# Patient Record
Sex: MALE
[De-identification: names, ages, dates, MRNs, and addresses within clinical notes are randomized per-mention and may not be internally consistent; named-entity substitution may affect disease eponyms.]

---

## 2018-11-27 ENCOUNTER — HOSPITAL ENCOUNTER (INPATIENT)
Dept: HOSPITAL 88 - ER | Age: 68
LOS: 5 days | DRG: 853 | End: 2018-12-02
Attending: INTERNAL MEDICINE | Admitting: INTERNAL MEDICINE
Payer: MEDICARE

## 2018-11-27 VITALS — HEIGHT: 72 IN | WEIGHT: 210.19 LBS | BODY MASS INDEX: 28.47 KG/M2

## 2018-11-27 VITALS — SYSTOLIC BLOOD PRESSURE: 139 MMHG | DIASTOLIC BLOOD PRESSURE: 63 MMHG

## 2018-11-27 DIAGNOSIS — E87.70: ICD-10-CM

## 2018-11-27 DIAGNOSIS — E11.628: ICD-10-CM

## 2018-11-27 DIAGNOSIS — R65.21: ICD-10-CM

## 2018-11-27 DIAGNOSIS — L03.115: ICD-10-CM

## 2018-11-27 DIAGNOSIS — J96.01: ICD-10-CM

## 2018-11-27 DIAGNOSIS — R53.81: ICD-10-CM

## 2018-11-27 DIAGNOSIS — E11.42: ICD-10-CM

## 2018-11-27 DIAGNOSIS — E87.6: ICD-10-CM

## 2018-11-27 DIAGNOSIS — I12.9: ICD-10-CM

## 2018-11-27 DIAGNOSIS — L97.529: ICD-10-CM

## 2018-11-27 DIAGNOSIS — I69.354: ICD-10-CM

## 2018-11-27 DIAGNOSIS — E11.610: ICD-10-CM

## 2018-11-27 DIAGNOSIS — B96.89: ICD-10-CM

## 2018-11-27 DIAGNOSIS — N17.0: ICD-10-CM

## 2018-11-27 DIAGNOSIS — N18.3: ICD-10-CM

## 2018-11-27 DIAGNOSIS — E78.5: ICD-10-CM

## 2018-11-27 DIAGNOSIS — Z79.01: ICD-10-CM

## 2018-11-27 DIAGNOSIS — E11.65: ICD-10-CM

## 2018-11-27 DIAGNOSIS — R13.10: ICD-10-CM

## 2018-11-27 DIAGNOSIS — Z66: ICD-10-CM

## 2018-11-27 DIAGNOSIS — I25.10: ICD-10-CM

## 2018-11-27 DIAGNOSIS — Z95.5: ICD-10-CM

## 2018-11-27 DIAGNOSIS — Z79.4: ICD-10-CM

## 2018-11-27 DIAGNOSIS — E87.2: ICD-10-CM

## 2018-11-27 DIAGNOSIS — N17.9: ICD-10-CM

## 2018-11-27 DIAGNOSIS — J18.9: ICD-10-CM

## 2018-11-27 DIAGNOSIS — B95.2: ICD-10-CM

## 2018-11-27 DIAGNOSIS — I89.0: ICD-10-CM

## 2018-11-27 DIAGNOSIS — A41.9: Primary | ICD-10-CM

## 2018-11-27 DIAGNOSIS — F03.90: ICD-10-CM

## 2018-11-27 DIAGNOSIS — D64.9: ICD-10-CM

## 2018-11-27 DIAGNOSIS — I48.91: ICD-10-CM

## 2018-11-27 DIAGNOSIS — R32: ICD-10-CM

## 2018-11-27 DIAGNOSIS — L97.419: ICD-10-CM

## 2018-11-27 DIAGNOSIS — E11.22: ICD-10-CM

## 2018-11-27 DIAGNOSIS — E11.69: ICD-10-CM

## 2018-11-27 DIAGNOSIS — N35.819: ICD-10-CM

## 2018-11-27 DIAGNOSIS — N13.30: ICD-10-CM

## 2018-11-27 DIAGNOSIS — E87.5: ICD-10-CM

## 2018-11-27 DIAGNOSIS — M86.8X7: ICD-10-CM

## 2018-11-27 DIAGNOSIS — N47.1: ICD-10-CM

## 2018-11-27 DIAGNOSIS — R33.9: ICD-10-CM

## 2018-11-27 DIAGNOSIS — E11.621: ICD-10-CM

## 2018-11-27 LAB
ALBUMIN SERPL-MCNC: 2.7 G/DL (ref 3.5–5)
ALBUMIN/GLOB SERPL: 0.5 {RATIO} (ref 0.8–2)
ALP SERPL-CCNC: 89 IU/L (ref 40–150)
ALT SERPL-CCNC: 16 IU/L (ref 0–55)
ANION GAP SERPL CALC-SCNC: 14.2 MMOL/L (ref 8–16)
BACTERIA URNS QL MICRO: (no result) /HPF
BASOPHILS # BLD AUTO: 0 10*3/UL (ref 0–0.1)
BASOPHILS NFR BLD AUTO: 0.1 % (ref 0–1)
BILIRUB UR QL: NEGATIVE
BNP BLD-MCNC: 485.6 PG/ML (ref 0–100)
BUN SERPL-MCNC: 35 MG/DL (ref 7–26)
BUN/CREAT SERPL: 17 (ref 6–25)
CALCIUM SERPL-MCNC: 9.5 MG/DL (ref 8.4–10.2)
CHLORIDE SERPL-SCNC: 102 MMOL/L (ref 98–107)
CLARITY UR: (no result)
CO2 SERPL-SCNC: 25 MMOL/L (ref 22–29)
COLOR UR: YELLOW
DEPRECATED APTT PLAS QN: 35.6 SECONDS (ref 23.8–35.5)
DEPRECATED INR PLAS: 1.05
DEPRECATED NEUTROPHILS # BLD AUTO: 12.5 10*3/UL (ref 2.1–6.9)
DEPRECATED RBC URNS MANUAL-ACNC: (no result) /HPF (ref 0–5)
EGFRCR SERPLBLD CKD-EPI 2021: 32 ML/MIN (ref 60–?)
EOSINOPHIL # BLD AUTO: 0.1 10*3/UL (ref 0–0.4)
EOSINOPHIL NFR BLD AUTO: 0.5 % (ref 0–6)
EPI CELLS URNS QL MICRO: (no result) /LPF
ERYTHROCYTE [DISTWIDTH] IN CORD BLOOD: 13.4 % (ref 11.7–14.4)
GLOBULIN PLAS-MCNC: 5.8 G/DL (ref 2.3–3.5)
GLUCOSE SERPLBLD-MCNC: 167 MG/DL (ref 74–118)
HCT VFR BLD AUTO: 27.6 % (ref 38.2–49.6)
HGB BLD-MCNC: 9.4 G/DL (ref 14–18)
KETONES UR QL STRIP.AUTO: NEGATIVE
LEUKOCYTE ESTERASE UR QL STRIP.AUTO: NEGATIVE
LYMPHOCYTES # BLD: 0.9 10*3/UL (ref 1–3.2)
LYMPHOCYTES NFR BLD AUTO: 6.2 % (ref 18–39.1)
MAGNESIUM SERPL-MCNC: 1.7 MG/DL (ref 1.3–2.1)
MCH RBC QN AUTO: 30.9 PG (ref 28–32)
MCHC RBC AUTO-ENTMCNC: 34.1 G/DL (ref 31–35)
MCV RBC AUTO: 90.8 FL (ref 81–99)
MONOCYTES # BLD AUTO: 1.1 10*3/UL (ref 0.2–0.8)
MONOCYTES NFR BLD AUTO: 7.4 % (ref 4.4–11.3)
NEUTS SEG NFR BLD AUTO: 85.4 % (ref 38.7–80)
NITRITE UR QL STRIP.AUTO: NEGATIVE
PLATELET # BLD AUTO: 364 X10E3/UL (ref 140–360)
POTASSIUM SERPL-SCNC: 3.2 MMOL/L (ref 3.5–5.1)
PROT UR QL STRIP.AUTO: (no result)
PROTHROMBIN TIME: 14.6 SECONDS (ref 11.9–14.5)
RBC # BLD AUTO: 3.04 X10E6/UL (ref 4.3–5.7)
SODIUM SERPL-SCNC: 138 MMOL/L (ref 136–145)
SP GR UR STRIP: 1.02 (ref 1.01–1.02)
UROBILINOGEN UR STRIP-MCNC: 0.2 MG/DL (ref 0.2–1)
WBC #/AREA URNS HPF: (no result) /HPF (ref 0–5)

## 2018-11-27 PROCEDURE — 85730 THROMBOPLASTIN TIME PARTIAL: CPT

## 2018-11-27 PROCEDURE — 83540 ASSAY OF IRON: CPT

## 2018-11-27 PROCEDURE — 94003 VENT MGMT INPAT SUBQ DAY: CPT

## 2018-11-27 PROCEDURE — 80202 ASSAY OF VANCOMYCIN: CPT

## 2018-11-27 PROCEDURE — 83880 ASSAY OF NATRIURETIC PEPTIDE: CPT

## 2018-11-27 PROCEDURE — 74470 X-RAY EXAM OF KIDNEY LESION: CPT

## 2018-11-27 PROCEDURE — 71045 X-RAY EXAM CHEST 1 VIEW: CPT

## 2018-11-27 PROCEDURE — 85007 BL SMEAR W/DIFF WBC COUNT: CPT

## 2018-11-27 PROCEDURE — 85027 COMPLETE CBC AUTOMATED: CPT

## 2018-11-27 PROCEDURE — 93925 LOWER EXTREMITY STUDY: CPT

## 2018-11-27 PROCEDURE — 84466 ASSAY OF TRANSFERRIN: CPT

## 2018-11-27 PROCEDURE — 99285 EMERGENCY DEPT VISIT HI MDM: CPT

## 2018-11-27 PROCEDURE — 87205 SMEAR GRAM STAIN: CPT

## 2018-11-27 PROCEDURE — 86140 C-REACTIVE PROTEIN: CPT

## 2018-11-27 PROCEDURE — 36600 WITHDRAWAL OF ARTERIAL BLOOD: CPT

## 2018-11-27 PROCEDURE — 85651 RBC SED RATE NONAUTOMATED: CPT

## 2018-11-27 PROCEDURE — 80053 COMPREHEN METABOLIC PANEL: CPT

## 2018-11-27 PROCEDURE — 81001 URINALYSIS AUTO W/SCOPE: CPT

## 2018-11-27 PROCEDURE — 94002 VENT MGMT INPAT INIT DAY: CPT

## 2018-11-27 PROCEDURE — 80048 BASIC METABOLIC PNL TOTAL CA: CPT

## 2018-11-27 PROCEDURE — 85025 COMPLETE CBC W/AUTO DIFF WBC: CPT

## 2018-11-27 PROCEDURE — 86850 RBC ANTIBODY SCREEN: CPT

## 2018-11-27 PROCEDURE — 82805 BLOOD GASES W/O2 SATURATION: CPT

## 2018-11-27 PROCEDURE — 83605 ASSAY OF LACTIC ACID: CPT

## 2018-11-27 PROCEDURE — 82575 CREATININE CLEARANCE TEST: CPT

## 2018-11-27 PROCEDURE — 86900 BLOOD TYPING SEROLOGIC ABO: CPT

## 2018-11-27 PROCEDURE — 96360 HYDRATION IV INFUSION INIT: CPT

## 2018-11-27 PROCEDURE — 82948 REAGENT STRIP/BLOOD GLUCOSE: CPT

## 2018-11-27 PROCEDURE — 36415 COLL VENOUS BLD VENIPUNCTURE: CPT

## 2018-11-27 PROCEDURE — 84484 ASSAY OF TROPONIN QUANT: CPT

## 2018-11-27 PROCEDURE — 87071 CULTURE AEROBIC QUANT OTHER: CPT

## 2018-11-27 PROCEDURE — 76937 US GUIDE VASCULAR ACCESS: CPT

## 2018-11-27 PROCEDURE — 76770 US EXAM ABDO BACK WALL COMP: CPT

## 2018-11-27 PROCEDURE — 82728 ASSAY OF FERRITIN: CPT

## 2018-11-27 PROCEDURE — 84166 PROTEIN E-PHORESIS/URINE/CSF: CPT

## 2018-11-27 PROCEDURE — 93005 ELECTROCARDIOGRAM TRACING: CPT

## 2018-11-27 PROCEDURE — 74176 CT ABD & PELVIS W/O CONTRAST: CPT

## 2018-11-27 PROCEDURE — 87186 SC STD MICRODIL/AGAR DIL: CPT

## 2018-11-27 PROCEDURE — 87070 CULTURE OTHR SPECIMN AEROBIC: CPT

## 2018-11-27 PROCEDURE — 94640 AIRWAY INHALATION TREATMENT: CPT

## 2018-11-27 PROCEDURE — 94660 CPAP INITIATION&MGMT: CPT

## 2018-11-27 PROCEDURE — 0JBQ0ZZ EXCISION OF RIGHT FOOT SUBCUTANEOUS TISSUE AND FASCIA, OPEN APPROACH: ICD-10-PCS | Performed by: PODIATRIST

## 2018-11-27 PROCEDURE — 83735 ASSAY OF MAGNESIUM: CPT

## 2018-11-27 PROCEDURE — 36556 INSERT NON-TUNNEL CV CATH: CPT

## 2018-11-27 PROCEDURE — 87086 URINE CULTURE/COLONY COUNT: CPT

## 2018-11-27 PROCEDURE — 85610 PROTHROMBIN TIME: CPT

## 2018-11-27 RX ADMIN — HYDRALAZINE HYDROCHLORIDE SCH MG: 25 TABLET ORAL at 23:00

## 2018-11-27 RX ADMIN — Medication SCH MG: at 23:00

## 2018-11-27 RX ADMIN — TAZOBACTAM SODIUM AND PIPERACILLIN SODIUM SCH MLS/HR: 375; 3 INJECTION, SOLUTION INTRAVENOUS at 23:19

## 2018-11-27 RX ADMIN — ACETAMINOPHEN PRN MG: 325 SOLUTION ORAL at 21:00

## 2018-11-27 NOTE — XMS REPORT
Clinical Summary

                             Created on: 2018



Fabián Mosqueda

External Reference #: [id number]

: 1950

Sex: Male



Demographics







                          Address                   [address] [place]  [address]

 

                          Home Phone                [phone]

 

                          Preferred Language        Unknown

 

                          Marital Status            

 

                          [employer]ybeterian Affiliation     [id number]

 

                          Race                      White

 

                          Ethnic Group              /Latin





Author







                          Author                    [facility]

 

                          Organization              [facility]

 

                          Address                   Unknown

 

                          Phone                     Unavailable







Support







                Name            Relationship    Address         Phone

 

                Fabián Mosqueda    [employer]            Unknown         [phone]







Care Team Providers







                    Care Team Member Name    Role                Phone

 

                    Sebastien Allison MD    PCP                 [phone]







Allergies

No Known Allergies



Medications







                          End Date                  Status



              Medication     Sig          Dispensed     Refills      Start  



                                         Date  

 

                                                    Active



                     pantoprazole (PROTONIX)     Take 40 mg by       0   



                           40 MG EC tablet           mouth daily     



                                         as needed.     

 

                                                    Active



                     pravastatin (PRAVACHOL)     Take 20 mg by       0   



                           20 MG tablet              mouth     



                                         nightly.     

 

                          2018                Discontinued



                     insulin NPH and regular     Inject 50           0   



                           human (HumuLIN 70/30) 100     Units under     



                           unit/mL (70-30) injection     the skin     



                                         every     



                                         morning.     

 

                          2018                Discontinued



                     insulin NPH and regular     Inject 20           0   



                           human (HumuLIN 70/30) 100     Units under     



                           unit/mL (70-30) injection     the skin     



                                         every     



                                         evening.     

 

                          2018                Discontinued



                     metoprolol succinate XL     Take 25 mg by       0   



                           (TOPROL-XL) 25 mg 24 hr     mouth daily.     



                                         tablet      

 

                          2018                Discontinued



                     acetaminophen (TYLENOL)     Take 500 mg         0   



                           500 MG tablet             by mouth once     



                                         as needed for     



                                         mild pain.     

 

                          2018                Discontinued



                     diphenhydrAMINE     Take 25 mg by       0   



                           (BENADRYL) 25 mg tablet     mouth every 8     



                                         (eight)     



                                         hours.     

 

                          2018                Discontinued



                 XARELTO 15 mg tablet     Take 1 tablet      0                 



                           by mouth                  7  



                                         daily.     

 

                          2018                



              insulin 70/30 NPH and     Inject 20     10 mL        12             



                     regular human (HumuLIN     Units under         8  



                           70/30) 100 unit/mL        the skin     



                           (70-30) injection         every morning     



                                         for 30 days.     

 

                          2018                



              insulin 70/30 NPH and     Inject 20     10 mL        12             



                     regular human (HumuLIN     Units under         8  



                           70/30) 100 unit/mL        the skin     



                           (70-30) injection         every evening     



                                         for 30 days.     

 

                          2018                



              rivaroxaban (XARELTO) 20     Take 1 tablet     30 tablet     0              



                     mg tablet           (20 mg total)       8  



                                         by mouth     



                                         daily for 30     



                                         days.     

 

                          2018                



                 cefepime (MAXIPIME) IVPB     Infuse 2 g      0                 



                     2 gram ADD-[place] in 50     into a venous       8  



                           mL                        catheter     



                                         every 12     



                                         (twelve)     



                                         hours for 33     



                                         days.     

 

                          2018                



                 metroNIDAZOLE (FLAGYL)     Take 1 tablet      0                 



                     500 MG tablet       (500 mg             8  



                                         total) by     



                                         mouth 3     



                                         (three) times     



                                         a day for 34     



                                         days.     

 

                          2018                



                 povidone-iodine     Apply           0                 



                     (BETADINE) 10 % external     topically           8  



                           solution                  daily for 30     



                                         days.     

 

                          2018                



              vancomycin 1,000 mg in     Infuse 1,000     1 each       0              



                     sodium chloride 0.9 % 250     mg into a           8  



                           mL IVPB                   venous     



                                         catheter     



                                         every 12     



                                         (twelve)     



                                         hours for 34     



                                         days.     

 

                          10/13/2018                



              hydrALAZINE (APRESOLINE)     Take 1 tablet     60 tablet     0              



                     50 MG tablet        (50 mg total)       8  



                                         by mouth     



                                         every 12     



                                         (twelve)     



                                         hours for 30     



                                         days.     

 

                          10/14/2018                



              metoprolol succinate XL     Take 1 tablet     30 tablet     0              



                     (TOPROL-XL) 100 mg 24 hr     (100 mg             8  



                           tablet                    total) by     



                                         mouth daily     



                                         for 30 days.     

 

                          10/13/2018                



              NIFEdipine XL (PROCARDIA     Take 1 tablet     60 tablet     0              



                     XL) 60 MG 24 hr tablet     (60 mg total)       8  



                                         by mouth 2     



                                         (two) times a     



                                         day for 30     



                                         days.     

 

                          10/13/2018                



              rivaroxaban (XARELTO) 15     Take 1 tablet     30 tablet     0              



                     mg tablet           (15 mg total)       8  



                                         by mouth     



                                         daily for 30     



                                         days.     

 

                          10/13/2018                



              ramelteon (ROZEREM) 8 mg     Take 1 tablet     30 tablet     0              



                     tablet              (8 mg total)        8  



                                         by mouth     



                                         nightly for     



                                         30 days.     

 

                          10/13/2018                



              insulin GLARGINE (LANTUS)     Inject 25     7.5 mL       0              



                     100 unit/mL injection     Units under         8  



                           (vial)                    the skin     



                                         nightly for     



                                         30 days.     

 

                          10/13/2018                



              insulin lispro (HumaLOG)     Inject 10     10 mL        12             



                     100 unit/mL injection     Units under         8  



                                         the skin 3     



                                         (three) times     



                                         a day with     



                                         meals for 30     



                                         days.     







Active Problems







 



                           Problem                   Noted Date

 

 



                           Uncontrolled type 2 diabetes mellitus with nephropathy     2018

 

 



                           Mixed hyperlipidemia      2018

 

 



                           Syncope and collapse      2018

 

 



                           Hypokalemia               2018

 

 



                           Cellulitis of left lower extremity     2018

 

 



                           Urinary tract infection in elderly patient     10/31/2017

 

 



                           Complicated UTI (urinary tract infection)     10/31/2017

 

 



                           Cerebrovascular accident (CVA)     2017







Encounters







                          Care Team                 Description



                     Date                Type                Specialty  

 

                                        



Emma Alba MD Patel, Amitkumar Natvarlal, MD          Cerebrovascular accident (CVA) due to embolism of

 middle cerebral artery, unspecified blood vessel laterality (Primary Dx)



                     2018          [facility]  



                           -                         Encounter   



                                         2018    

 

                                                    N/A



                     2018          Intake              Access  

 

                                        



Denis Molian MD Tang, Daming, MD Joglekar, Swati, MD                     Other acute osteomyelitis of left ankle (Primary Dx); 

Cellulitis of left lower extremity; 

Dehydration; 

Hypokalemia; 

Cellulitis of left leg; 

Chronic cerebrovascular accident (CVA); 

Controlled type 2 diabetes mellitus with other skin complication, without long-
term current use of insulin; 

Essential hypertension; 

Bradycardia; 

Abscess of left leg



                     2018          [facility]  



                           -                         Encounter   



                                         2018    

 

                                        



[facility]                       



                     2018          Orders Only         Procedural Cardiology  



after 2017



Immunizations







  



                     Name                Dates Previously Given     Next Due

 

  



                           FLUCELVAX QUAD PF (0.5mL     2018 



                                         syringe)  







Social History







                                        Date



                 Tobacco Use     Types           Packs/Day       Years Used 

 

                                         



                                         Never Smoker    

 

    



                                         Smokeless Tobacco: Never   



                                         Used   









   



                 Alcohol Use     Drinks/Week     oz/Week         Comments

 

   



                                         No   









 



                           Sex Assigned at Birth     Date Recorded

 

 



                                         Not on file 









                                        Industry



                           Job Start Date            Occupation 

 

                                        Not on file



                           Not on file               Not on file 









                                        Travel End



                           Travel History            Travel Start 

 





                                         No recent travel history available.







Last Filed Vital Signs







                                        Time Taken



                           Vital Sign                Reading 

 

                                        2018  8:22 AM CDT



                           Blood Pressure            140/69 

 

                                        2018  8:22 AM CDT



                           Pulse                     61 

 

                                        2018  8:22 AM CDT



                           Temperature               36.3 C (97.4 F) 

 

                                        2018  8:22 AM CDT



                           Respiratory Rate          19 

 

                                        2018  8:22 AM CDT



                           Oxygen Saturation         97% 

 

                                        -



                           Inhaled Oxygen            - 



                                         Concentration  

 

                                        2018  5:00 PM CDT



                           Weight                    87.1 kg (192 lb) 

 

                                        2018  5:00 PM CDT



                           Height                    182.9 cm (6') 

 

                                        2018  5:00 PM CDT



                           Body Mass Index           26.04 







Plan of Treatment







   



                 Health Maintenance     Due Date        Last Done       Comments

 

   



                           DIABETIC RETINAL EYE EXAM     1950  

 

   



                           URINE MICROALBUMIN        1958  

 

   



                           COLON CANCER SCREENING     1998  

 

   



                           SHINGRIX VACCINE (1 of 2)     1998  

 

   



                           ZOSTER VACCINE            2008  

 

   



                           PNEUMOCOCCAL              2013  



                                         POLYSACCHARIDE VACCINE   



                                         AGE 65 AND OVER   

 

   



                           PNEUMOCOCCAL-13           2013  

 

   



                     DIABETIC FOOT EXAM     2019, 2018 

 

   



                     INFLUENZA VACCINE     Completed           2018 







Procedures







                                        Comments



                 Procedure Name     Priority        Date/Time       Associated Diagnosis 

 

                                        



Results for this procedure are in the results section.



                     POC GLUCOSE         Routine             2018  



                                         12:52 PM CDT  

 

                                        



Results for this procedure are in the results section.



                     POC GLUCOSE         Routine             2018  



                                         9:01 AM CDT  

 

                                        



Results for this procedure are in the results section.



                     POC GLUCOSE         Routine             2018  



                                         9:07 PM CDT  

 

                                        



Results for this procedure are in the results section.



                     POC GLUCOSE         Routine             2018  



                                         5:02 PM CDT  

 

                                        



Results for this procedure are in the results section.



                     POC GLUCOSE         Routine             2018  



                                         12:07 PM CDT  

 

                                        



Results for this procedure are in the results section.



                     POC GLUCOSE         Routine             2018  



                                         7:48 AM CDT  

 

                                        



Results for this procedure are in the results section.



                     POC GLUCOSE         Routine             2018  



                                         9:33 PM CDT  

 

                                        



Results for this procedure are in the results section.



                     POC GLUCOSE         Routine             2018  



                                         5:19 PM CDT  

 

                                        



Results for this procedure are in the results section.



                     FL MODIFIED BARIUM     Routine             2018  



                           SWALLOW                   2:32 PM CDT  

 

                                        



Results for this procedure are in the results section.



                     POC GLUCOSE         Routine             2018  



                                         11:32 AM CDT  

 

                                        



Results for this procedure are in the results section.



                     POC GLUCOSE         Routine             2018  



                                         8:51 AM CDT  

 

                                        



Results for this procedure are in the results section.



                     POC GLUCOSE         Routine             2018  



                                         5:03 AM CDT  

 

                                        



Results for this procedure are in the results section.



                     ZZESTIMATED GFR     Routine             2018  



                                         4:45 AM CDT  

 

                                        



Results for this procedure are in the results section.



                     PARTIAL THROMBOPLASTIN     Routine             2018  



                           TIME (PTT)                4:45 AM CDT  

 

                                        



Results for this procedure are in the results section.



                     MAGNESIUM LEVEL     Routine             2018  



                                         4:45 AM CDT  

 

                                        



Results for this procedure are in the results section.



                     HC COMPLETE BLD COUNT     Routine             2018  



                           W/AUTO DIFF               4:45 AM CDT  

 

                                        



Results for this procedure are in the results section.



                     BASIC METABOLIC PANEL     Routine             2018  



                                         4:45 AM CDT  

 

                                        



Results for this procedure are in the results section.



                     POC GLUCOSE         Routine             09/10/2018  



                                         11:14 PM CDT  

 

                                        



Results for this procedure are in the results section.



                     POC GLUCOSE         Routine             09/10/2018  



                                         7:45 PM CDT  

 

                                        



Results for this procedure are in the results section.



                     PARTIAL THROMBOPLASTIN     Routine             09/10/2018  



                           TIME (PTT)                3:00 PM CDT  

 

                                        



Results for this procedure are in the results section.



                     POC GLUCOSE         Routine             09/10/2018  



                                         12:41 PM CDT  

 

                                        



Results for this procedure are in the results section.



                     EEG AWAKE/ASLEEP LESS     Routine             09/10/2018  



                           THAN 41 MIN               10:35 AM CDT  

 

                                        



Results for this procedure are in the results section.



                     PARTIAL THROMBOPLASTIN     Routine             09/10/2018  



                           TIME (PTT)                9:00 AM CDT  

 

                                        



Results for this procedure are in the results section.



                     POC GLUCOSE         Routine             09/10/2018  



                                         8:30 AM CDT  

 

                                        



Results for this procedure are in the results section.



                     MRA NECK WO CONTRAST     Routine             09/10/2018  



                                         7:38 AM CDT  

 

                                        



Results for this procedure are in the results section.



                     MRA HEAD WO CONTRAST     Routine             09/10/2018  



                                         7:22 AM CDT  

 

                                        



Results for this procedure are in the results section.



                     MRI BRAIN WO CONTRAST     Routine             09/10/2018  



                                         7:10 AM CDT  

 

                                        



Results for this procedure are in the results section.



                     ZZESTIMATED GFR     Routine             09/10/2018  



                                         1:24 AM CDT  

 

                                        



Results for this procedure are in the results section.



                     MAGNESIUM LEVEL     Routine             09/10/2018  



                                         1:24 AM CDT  

 

                                        



Results for this procedure are in the results section.



                     BASIC METABOLIC PANEL     Routine             09/10/2018  



                                         1:24 AM CDT  

 

                                        



Results for this procedure are in the results section.



                     PARTIAL THROMBOPLASTIN     Timed               09/10/2018  



                           TIME (PTT)                12:40 AM CDT  

 

                                        



Results for this procedure are in the results section.



                     HC COMPLETE BLD COUNT     Routine             09/10/2018  



                           W/AUTO DIFF               12:40 AM CDT  

 

                                        



Results for this procedure are in the results section.



                     POC GLUCOSE         Routine             2018  



                                         11:42 PM CDT  

 

                                        



Results for this procedure are in the results section.



                     POC GLUCOSE         Routine             2018  



                                         5:47 PM CDT  

 

                                        



Results for this procedure are in the results section.



                     PARTIAL THROMBOPLASTIN     Timed               2018  



                           TIME (PTT)                5:15 PM CDT  

 

                                        



Results for this procedure are in the results section.



                     XR BONE SURVEY SKELETAL     Routine             2018  



                                         4:23 PM CDT  

 

                                        



Results for this procedure are in the results section.



                     US DUPLEX VENOUS UPPER     Routine             2018  



                           EXTREMITY LEFT            2:33 PM CDT  

 

                                        



Results for this procedure are in the results section.



                     US DUPLEX VENOUS LOWER     Routine             2018  



                           EXTREMITY BILATERAL       2:33 PM CDT  

 

                                        



Results for this procedure are in the results section.



                     POC GLUCOSE         Routine             2018  



                                         1:13 PM CDT  

 

                                        



Results for this procedure are in the results section.



                     POC GLUCOSE         Routine             2018  



                                         12:17 PM CDT  

 

                                        



Results for this procedure are in the results section.



                     CT HEAD WO CONTRAST     STAT                2018  



                                         10:27 AM CDT  

 

                                        



Results for this procedure are in the results section.



                     PARTIAL THROMBOPLASTIN     Routine             2018  



                           TIME (PTT)                9:21 AM CDT  

 

                                        



Results for this procedure are in the results section.



                     VITAMIN D 25 HYDROXY     Routine             2018  



                           LEVEL                     8:59 AM CDT  

 

                                        



Results for this procedure are in the results section.



                     POC GLUCOSE         Routine             2018  



                                         8:21 AM CDT  

 

                                        



Results for this procedure are in the results section.



                     ZZESTIMATED GFR     Routine             2018  



                                         1:43 AM CDT  

 

                                        



Results for this procedure are in the results section.



                     VITAMIN B12 LEVEL     Routine             2018  



                                         1:43 AM CDT  

 

                                        



Results for this procedure are in the results section.



                     THYROID STIMULATING     Routine             2018  



                           HORMONE                   1:43 AM CDT  

 

                                        



Results for this procedure are in the results section.



                     T4, FREE            Routine             2018  



                                         1:43 AM CDT  

 

                                        



Results for this procedure are in the results section.



                     COMPREHENSIVE METABOLIC     Routine             2018  



                           PANEL                     1:43 AM CDT  

 

                                        



Results for this procedure are in the results section.



                     RHEUMATOID FACTOR     Routine             2018  



                                         1:43 AM CDT  

 

                                        



Results for this procedure are in the results section.



                     LIPID PANEL         Routine             2018  



                                         1:43 AM CDT  

 

                                        



Results for this procedure are in the results section.



                     HOMOCYSTINE, PLASMA     Routine             2018  



                                         1:43 AM CDT  

 

                                        



Results for this procedure are in the results section.



                     C-REACTIVE PROTEIN     Routine             2018  



                                         1:43 AM CDT  

 

                                        



Results for this procedure are in the results section.



                     MAGNESIUM LEVEL     Routine             2018  



                                         1:43 AM CDT  

 

                                        



Results for this procedure are in the results section.



                     SEDIMENTATION RATE     Routine             2018  



                                         1:20 AM CDT  

 

                                        



Results for this procedure are in the results section.



                     HEMOGLOBIN A1C      Routine             2018  



                                         1:20 AM CDT  

 

                                        



Results for this procedure are in the results section.



                     FOLATE RBC (GROUP TEST)     Routine             2018  



                                         1:20 AM CDT  

 

                                        



Results for this procedure are in the results section.



                     CBC WITH PLATELET AND     Routine             2018  



                           DIFFERENTIAL              1:20 AM CDT  

 

                                        



Results for this procedure are in the results section.



                     PARTIAL THROMBOPLASTIN     Timed               2018  



                           TIME (PTT)                1:00 AM CDT  

 

                                        



Results for this procedure are in the results section.



                     PROTHROMBIN TIME WITH INR     Routine             2018  



                                         6:40 PM CDT  

 

                                        



Results for this procedure are in the results section.



                     PARTIAL THROMBOPLASTIN     Routine             2018  



                           TIME (PTT)                6:40 PM CDT  

 

                                        



Results for this procedure are in the results section.



                     BLOOD CULTURE, AEROBIC &     Routine             2018  



                           ANAEROBIC                 6:35 PM CDT  

 

                                        



Results for this procedure are in the results section.



                     POC GLUCOSE         Routine             2018  



                                         6:06 PM CDT  

 

                                        



Results for this procedure are in the results section.



                     CT HEAD WO CONTRAST     Routine             2018  



                                         3:24 PM CDT  

 

                                        



Results for this procedure are in the results section.



                     ECG 12-LEAD         Routine             2018  



                                         2:07 PM CDT  

 

                                        



Results for this procedure are in the results section.



                     POC GLUCOSE         Routine             2018  



                                         1:52 PM CDT  

 

                                        



Results for this procedure are in the results section.



                     ECHOCARDIOGRAM 2D     Routine             2018  



                           COMPLETE W MMODE SPECTRAL      12:20 PM CDT  



                                         COLOR DOPPLER (99694)    

 

                                        



Results for this procedure are in the results section.



                     POC GLUCOSE         Routine             2018  



                                         10:10 AM CDT  

 

                                        



Results for this procedure are in the results section.



                     XR CHEST 2 VW       Routine             2018  



                                         8:17 AM CDT  

 

                                        



Results for this procedure are in the results section.



                     URINALYSIS SCREEN AND     Routine             2018  



                           MICROSCOPY, WITH REFLEX      1:18 AM CDT  



                                         TO CULTURE    

 

                                        



Results for this procedure are in the results section.



                     URINE CULTURE       Routine             2018  



                                         1:18 AM CDT  

 

                                        



Results for this procedure are in the results section.



                     HC COMPLETE BLD COUNT     Routine             2018  



                           W/AUTO DIFF               12:15 AM CDT  

 

                                        



Results for this procedure are in the results section.



                     ZZESTIMATED GFR     Routine             2018  



                                         11:30 PM CDT  

 

                                        



Results for this procedure are in the results section.



                     COMPREHENSIVE METABOLIC     Routine             2018  



                           PANEL                     11:30 PM CDT  

 

                                        



Results for this procedure are in the results section.



                     POC GLUCOSE         Routine             2018  



                                         5:50 PM CST  

 

                                        



Results for this procedure are in the results section.



                     POC GLUCOSE         Routine             2018  



                                         12:17 PM CST  

 

                                        



Results for this procedure are in the results section.



                     POC GLUCOSE         Routine             2018  



                                         7:53 AM CST  

 

                                        



Results for this procedure are in the results section.



                     MANUAL DIFFERENTIAL     Routine             2018  



                                         5:30 AM CST  

 

                                        



Results for this procedure are in the results section.



                     ZZESTIMATED GFR     Routine             2018  



                                         5:30 AM CST  

 

                                        



Results for this procedure are in the results section.



                     BASIC METABOLIC PANEL     Routine             2018  



                                         5:30 AM CST  

 

                                        



Results for this procedure are in the results section.



                     CBC WITH PLATELET AND     Routine             2018  



                           DIFFERENTIAL              5:30 AM CST  

 

                                        



Results for this procedure are in the results section.



                     POC GLUCOSE         Routine             2018  



                                         10:14 PM CST  

 

                                        



Results for this procedure are in the results section.



                     POC GLUCOSE         Routine             2018  



                                         6:45 PM CST  

 

                                        



Results for this procedure are in the results section.



                     POC GLUCOSE         Routine             2018  



                                         6:16 AM CST  

 

                                        



Results for this procedure are in the results section.



                     POC GLUCOSE         Routine             2018  



                                         5:56 AM CST  

 

                                        



Results for this procedure are in the results section.



                     POC GLUCOSE         Routine             2018  



                                         5:20 AM CST  

 

                                        



Results for this procedure are in the results section.



                     ZZESTIMATED GFR     Routine             2018  



                                         4:00 AM CST  

 

                                        



Results for this procedure are in the results section.



                     C-REACTIVE PROTEIN     Routine             2018  



                                         4:00 AM CST  

 

                                        



Results for this procedure are in the results section.



                     COMPREHENSIVE METABOLIC     Routine             2018  



                           PANEL                     4:00 AM CST  

 

                                        



Results for this procedure are in the results section.



                     MANUAL DIFFERENTIAL     Routine             2018  



                                         3:30 AM CST  

 

                                        



Results for this procedure are in the results section.



                     SEDIMENTATION RATE     Routine             2018  



                                         3:30 AM CST  

 

                                        



Results for this procedure are in the results section.



                     CBC WITH PLATELET AND     Routine             2018  



                           DIFFERENTIAL              3:30 AM CST  

 

                                        



Results for this procedure are in the results section.



                     POC GLUCOSE         Routine             2018  



                                         9:17 PM CST  

 

                                        



Results for this procedure are in the results section.



                     POC GLUCOSE         Routine             2018  



                                         4:51 PM CST  

 

                                        



Results for this procedure are in the results section.



                 XR PICC CHEST PORTABLE     Routine         2018      Cellulitis of left leg 



                                         4:13 PM CST  

 

                                        



Results for this procedure are in the results section.



                     HC CATH DUAL LUMEN PICC     Routine             2018  



                                         3:49 PM CST  

 

                                        



Results for this procedure are in the results section.



                     HC US GUIDED VASCULAR     Routine             2018  



                           ACCESS                    3:49 PM CST  

 

                                        



Results for this procedure are in the results section.



                     HC CVL PICC INSERT 5 YRS     Routine             2018  



                           OR >                      3:49 PM CST  

 

                                        



Results for this procedure are in the results section.



                     POC GLUCOSE         Routine             2018  



                                         11:28 AM CST  

 

                                        



Results for this procedure are in the results section.



                     POC GLUCOSE         Routine             2018  



                                         7:17 AM CST  

 

                                        



Results for this procedure are in the results section.



                     POC GLUCOSE         Routine             2018  



                                         9:30 PM CST  

 

                                        



Results for this procedure are in the results section.



                     POC GLUCOSE         Routine             2018  



                                         4:58 PM CST  

 

                                        



Results for this procedure are in the results section.



                     POC GLUCOSE         Routine             2018  



                                         1:06 PM CST  

 

                                        



Results for this procedure are in the results section.



                     VANCOMYCIN LEVEL, TROUGH     Timed               2018  



                                         9:38 AM CST  

 

                                        



Results for this procedure are in the results section.



                     POC GLUCOSE         Routine             2018  



                                         7:47 AM CST  

 

                                        



Results for this procedure are in the results section.



                     ZZESTIMATED GFR     Routine             2018  



                                         7:00 AM CST  

 

                                        



Results for this procedure are in the results section.



                     BASIC METABOLIC PANEL     Routine             2018  



                                         7:00 AM CST  

 

                                        



Results for this procedure are in the results section.



                     CBC HEMOGRAM        Routine             2018  



                                         7:00 AM CST  

 

                                        



Results for this procedure are in the results section.



                     POC GLUCOSE         Routine             01/15/2018  



                                         8:37 PM CST  

 

                                        



Results for this procedure are in the results section.



                     XR FOOT 3+ VW LEFT     Routine             01/15/2018  



                                         7:52 PM CST  

 

                                        



Results for this procedure are in the results section.



                     POC GLUCOSE         Routine             01/15/2018  



                                         6:40 PM CST  

 

                                        



Results for this procedure are in the results section.



                     GRAM STAIN          Routine             01/15/2018  



                                         6:37 PM CST  

 

                                        



Results for this procedure are in the results section.



                     AFB STAIN           Routine             01/15/2018  



                                         6:37 PM CST  

 

                                        



Results for this procedure are in the results section.



                     FUNGUS SMEAR        Routine             01/15/2018  



                                         6:37 PM CST  

 

                                        



Results for this procedure are in the results section.



                     AEROBIC CULTURE     Routine             01/15/2018  



                                         6:37 PM CST  

 

                                        



Results for this procedure are in the results section.



                     AFB CULTURE         Routine             01/15/2018  



                                         6:37 PM CST  

 

                                        



Results for this procedure are in the results section.



                     ANAEROBIC CULTURE     Routine             01/15/2018  



                                         6:37 PM CST  

 

                                        



Results for this procedure are in the results section.



                     FUNGUS CULTURE      Routine             01/15/2018  



                                         6:37 PM CST  

 

                                        



Results for this procedure are in the results section.



                     POC GLUCOSE         Routine             01/15/2018  



                                         12:21 PM CST  

 

                                        



Results for this procedure are in the results section.



                     POC GLUCOSE         Routine             01/15/2018  



                                         7:49 AM CST  

 

                                        



Results for this procedure are in the results section.



                     POC GLUCOSE         Routine             2018  



                                         11:00 PM CST  

 

                                        



Results for this procedure are in the results section.



                     POC GLUCOSE         Routine             2018  



                                         5:40 PM CST  

 

                                        



Results for this procedure are in the results section.



                     POC GLUCOSE         Routine             2018  



                                         1:13 PM CST  

 

                                        



Results for this procedure are in the results section.



                     POC GLUCOSE         Routine             2018  



                                         8:19 AM CST  

 

                                        



Results for this procedure are in the results section.



                     ZZESTIMATED GFR     Routine             2018  



                                         4:50 AM CST  

 

                                        



Results for this procedure are in the results section.



                     HC COMPLETE BLD COUNT     Routine             2018  



                           W/AUTO DIFF               4:50 AM CST  

 

                                        



Results for this procedure are in the results section.



                     BASIC METABOLIC PANEL     Routine             2018  



                                         4:50 AM CST  

 

                                        



Results for this procedure are in the results section.



                     POC GLUCOSE         Routine             2018  



                                         10:25 PM CST  

 

                                        



Results for this procedure are in the results section.



                     POC GLUCOSE         Routine             2018  



                                         7:21 PM CST  

 

                                        



Results for this procedure are in the results section.



                     MRI LOWER EXTREMITY W WO     Routine             2018  



                           CONTRAST LEFT             6:35 PM CST  

 

                                        



Results for this procedure are in the results section.



                     GRAM STAIN          Routine             2018  



                                         4:51 PM CST  

 

                                        



Results for this procedure are in the results section.



                     ANAEROBIC CULTURE     Routine             2018  



                                         4:51 PM CST  

 

                                        



Results for this procedure are in the results section.



                     AEROBIC CULTURE     Routine             2018  



                                         4:51 PM CST  

 

                                        



Results for this procedure are in the results section.



                     POC GLUCOSE         Routine             2018  



                                         7:54 AM CST  

 

                                        



Results for this procedure are in the results section.



                     LACTIC ACID LEVEL, SEPSIS     Timed               2018  



                           - NOW AND REPEAT 2X EVERY      5:10 AM CST  



                                         3 HOURS    

 

                                        



Results for this procedure are in the results section.



                     POC GLUCOSE         Routine             2018  



                                         5:08 AM CST  

 

                                        



Results for this procedure are in the results section.



                     LACTIC ACID LEVEL, SEPSIS     Timed               2018  



                           - NOW AND REPEAT 2X EVERY      1:07 AM CST  



                                         3 HOURS    

 

                                        



Results for this procedure are in the results section.



                     US DUPLEX VENOUS LOWER     STAT                2018  



                           EXTREMITY LEFT            11:19 PM CST  

 

                                        



Results for this procedure are in the results section.



                     US DUPLEX ARTERIAL LOWER     STAT                2018  



                           EXTREMITY LEFT            10:45 PM CST  

 

                                        



Results for this procedure are in the results section.



                     ZZESTIMATED GFR     STAT                2018  



                                         5:33 PM CST  

 

                                        



Results for this procedure are in the results section.



                     TROPONIN            STAT                2018  



                                         5:33 PM CST  

 

                                        



Results for this procedure are in the results section.



                     B NATRIURETIC PEPTIDE     STAT                2018  



                                         5:33 PM CST  

 

                                        



Results for this procedure are in the results section.



                     LACTIC ACID LEVEL, SEPSIS     STAT                2018  



                           - NOW AND REPEAT 2X EVERY      5:33 PM CST  



                                         3 HOURS    

 

                                        



Results for this procedure are in the results section.



                     COMPREHENSIVE METABOLIC     STAT                2018  



                           PANEL                     5:33 PM CST  

 

                                        



Results for this procedure are in the results section.



                     PARTIAL THROMBOPLASTIN     STAT                2018  



                           TIME (PTT)                5:33 PM CST  

 

                                        



Results for this procedure are in the results section.



                     PROTHROMBIN TIME WITH INR     STAT                2018  



                                         5:33 PM CST  

 

                                        



Results for this procedure are in the results section.



                     HC COMPLETE BLD COUNT     STAT                2018  



                           W/AUTO DIFF               5:33 PM CST  

 

                                        



Results for this procedure are in the results section.



                     BLOOD CULTURE, AEROBIC &     Routine             2018  



                           ANAEROBIC                 5:33 PM CST  

 

                                        



Results for this procedure are in the results section.



                     BLOOD CULTURE, AEROBIC &     Routine             2018  



                           ANAEROBIC                 5:33 PM CST  



after 2017



Results

* POC glucose (2018 12:52 PM CDT)



Only the most recent of 51 results within the time period is included.





   



                 Component       Value           Ref Range       Performed At

 

   



                 POC glucose     242 (H)         65 - 99 mg/dL     [facility] DEPARTMENT OF



                           Comment:                  PATHOLOGY AND



                           [facility] Notified RN           GENOMIC MEDICINE



                                         Meter ID: [id number]  



                                         : Yonatan Merida  









   



                 Performing Organization     Address         City/[facility]     Phone Number

 

   



                     [facility] DEPARTMENT OF     [address] [place] [address] 



                                         PATHOLOGY AND GENOMIC   



                                         MEDICINE   





* FL Modified Barium Swallow (2018  2:32 PM CDT)





 



                           Narrative                 Performed At

 

 



                           EXAMINATION:FL MODIFIED BARIUM SWALLOW      [facility]



                                         CLINICAL HISTORY:Dysphagia following cerebral infarction 



                                         COMPARISON:None. 



                                         RADIATION EXPOSURE: 8.8 mGy air kerma 



                                         TECHNIQUE: 



                                         The patient swallowed varying consistencies of barium under direct lateral 



                                         fluoroscopic evaluation. The study was performed in conjunction with speech 



                                         pathology. 



                                         IMPRESSION: 



                                         There is flash laryngeal penetration during swallowing of thin and nectar 



                                         liquids. No aspiration was seen. 



                                         Please refer to Speech Pathology report for further details. 



                                         [facility]-[id number] 









                                        Procedure Note

 

                                        



 Interface, Radiology Results Incoming - 2018  2:56 PM CDT



EXAMINATION:  FL MODIFIED BARIUM SWALLOW



CLINICAL HISTORY:  Dysphagia following cerebral infarction



COMPARISON:  None.



RADIATION EXPOSURE: 8.8 mGy air kerma



TECHNIQUE:



The patient swallowed varying consistencies of barium under direct lateral 
fluoroscopic evaluation. The study was performed in conjunction with speech 
pathology.



IMPRESSION:



There is flash laryngeal penetration during swallowing of thin and nectar 
liquids. No aspiration was seen.



Please refer to Speech Pathology report for further details.



[facility]-[id number]











   



                 Performing Organization     Address         City/[facility] Cent[facility]ode     Phone Number

 

   



                      [facility]          [address] [place] [address] 





* Estimated GFR (2018  4:45 AM CDT)



Only the most recent of 9 results within the time period is included.





   



                 Component       Value           Ref Range       Performed At

 

   



                 GFR Non Af Amer     46 (A)          mL/min/1.73 m2     [facility] DEPARTMENT OF



                                         PATHOLOGY AND



                                         GENOMIC MEDICINE

 

   



                 GFR Af Amer     56 (A)          mL/min/1.73 m2     [facility] DEPARTMENT OF



                           Comment:                  PATHOLOGY AND



                           Chronic kidney disease: <60      GENOMIC MEDICINE



                                         mL/min/1.73m2  



                                         Kidney failure: <15  



                                         mL/min/1.73m2  



                                         The estimated GFR is  



                                         calculated from the  



                                         IDMS-traceable Modification of  



                                         Diet  



                                         in Renal Disease Equation. The  



                                         accuracy of the calculation is  



                                         poor when the  



                                         creatinine is normal.  



                                         Calculated values >90  



                                         mL/min/1.73m2 are not  



                                         reported.  



                                         This equation has not been  



                                         validated in children (<18  



                                         years), pregnant  



                                         women, the elderly (>70  



                                         years), or ethnic groups other  



                                         than Caucasians and  



                                          Americans.  













                                         Specimen

 





                                         Plasma specimen









   



                 Performing Organization     Address         City/[facility] Centercode     Phone Number

 

   



                     [place] [address] 



                                         PATHOLOGY AND [facility]   



                                         MEDICINE   





* Partial thromboplastin time, activated (2018  4:45 AM CDT)



Only the most recent of 9 results within the time period is included.





   



                 Component       Value           Ref Range       Performed At

 

   



                 PTT             30.5            23.0 - 36.0 sec     [facility] DEPARTMENT OF



                           Comment:                  PATHOLOGY AND



                           PTT therapeutic range for      [facility]



                                         unfractionated heparin is  



                                         61.0-112.0 seconds which  



                                         corresponds to Anti-Xa  



                                         0.3-0.7 U/ml.  













                                         Specimen

 





                                         Blood









   



                 Performing Organization     Address         City/[facility] Centercode     Phone Number

 

   



                     [place] [address] 



                                         PATHOLOGY AND [employer]   



                                         MEDICINE   





* CBC with platelet and differential (2018  4:45 AM CDT)



Only the most recent of 8 results within the time period is included.





   



                 Component       Value           Ref Range       Performed At

 

   



                 WBC             8.22            4.50 - 11.00 k/uL     [facility] DEPARTMENT OF



                                         PATHOLOGY AND



                                         GENOMIC MEDICINE

 

   



                 RBC             4.54            4.40 - 6.00 m/uL     [facility] DEPARTMENT OF



                                         PATHOLOGY AND



                                         GENOMIC MEDICINE

 

   



                 HGB             13.2 (L)        14.0 - 18.0 g/dL     [facility] DEPARTMENT OF



                                         PATHOLOGY AND



                                         GENOMIC MEDICINE

 

   



                 HCT             39.7 (L)        41.0 - 51.0 %     [facility] DEPARTMENT OF



                                         PATHOLOGY AND



                                         GENOMIC MEDICINE

 

   



                 MCV             87.4            82.0 - 100.0 fL     [facility] DEPARTMENT OF



                                         PATHOLOGY AND



                                         GENOMIC MEDICINE

 

   



                 MCH             29.1            27.0 - 34.0 pg     [facility] DEPARTMENT OF



                                         PATHOLOGY AND



                                         GENOMIC MEDICINE

 

   



                 MCHC            33.2            31.0 - 37.0 g/dL     [facility] DEPARTMENT OF



                                         PATHOLOGY AND



                                         GENOMIC MEDICINE

 

   



                 RDW - SD        44.3            37.0 - 55.0 fL     [facility] DEPARTMENT OF



                                         PATHOLOGY AND



                                         GENOMIC MEDICINE

 

   



                 MPV             11.0            8.8 - 13.2 fL     [facility] DEPARTMENT OF



                                         PATHOLOGY AND



                                         GENOMIC MEDICINE

 

   



                 Platelet count     194             150 - 400 k/uL     [facility] DEPARTMENT OF



                                         PATHOLOGY AND



                                         GENOMIC MEDICINE

 

   



                 Nucleated RBC     0.00            /100 WBC        [facility] DEPARTMENT OF



                                         PATHOLOGY AND



                                         GENOMIC MEDICINE

 

   



                 Neutrophils     72.7 (H)        39.0 - 69.0 %     [facility] DEPARTMENT OF



                                         PATHOLOGY AND



                                         GENOMIC MEDICINE

 

   



                 Lymphocytes     14.0 (L)        25.0 - 45.0 %     [facility] DEPARTMENT OF



                                         PATHOLOGY AND



                                         GENOMIC MEDICINE

 

   



                 Monocytes       11.7 (H)        0.0 - 10.0 %     [facility] DEPARTMENT OF



                                         PATHOLOGY AND



                                         GENOMIC MEDICINE

 

   



                 Eosinophils     1.0             0.0 - 5.0 %     [facility] DEPARTMENT OF



                                         PATHOLOGY AND



                                         GENOMIC MEDICINE

 

   



                 Basophils       0.2             0.0 - 1.0 %     [facility] DEPARTMENT OF



                                         PATHOLOGY AND



                                         GENOMIC MEDICINE

 

   



                 Immature granulocytes     0.4Comment: "Immature     0.0 - 1.0 %     [facility] DEPARTMENT OF





                           granulocytes"  (promyelocytes,      PATHOLOGY AND



                           myelocytes, metamyelocytes)      GENOMIC MEDICINE













                                         Specimen

 





                                         Blood









   



                 Performing Organization     Address         City/[facility]/Zipcode     Phone Number

 

   



                     [place] [address] 



                                         PATHOLOGY AND GENOMIC   



                                         MEDICINE   





* Magnesium level (2018  4:45 AM CDT)



Only the most recent of 3 results within the time period is included.





   



                 Component       Value           Ref Range       Performed At

 

   



                 Magnesium       2.1             1.6 - 2.4 mg/dL     [facility] DEPARTMENT OF



                                         PATHOLOGY AND



                                         GENOMIC MEDICINE













                                         Specimen

 





                                         Plasma specimen









   



                 Performing Organization     Address         City/[facility] Cent[facility]ode     Phone Number

 

   



                     [place] [address] 



                                         PATHOLOGY [facility]   





* Basic metabolic panel (2018  4:45 AM CDT)



Only the most recent of 5 results within the time period is included.





   



                 Component       Value           Ref Range       Performed At

 

   



                 Sodium          138             135 - 148 mEq/L     [facility] DEPARTMENT OF



                                         PATHOLOGY AND



                                         GENOMIC MEDICINE

 

   



                 Potassium       3.5             3.5 - 5.0 mEq/L     [facility] DEPARTMENT OF



                                         PATHOLOGY AND



                                         GENOMIC MEDICINE

 

   



                 Chloride        100             98 - 112 mEq/L     [facility] DEPARTMENT OF



                                         PATHOLOGY AND



                                         GENOMIC MEDICINE

 

   



                 CO2             26              24 - 31 mEq/L     [facility] DEPARTMENT OF



                                         PATHOLOGY AND



                                         GENOMIC MEDICINE

 

   



                 Anion gap       12@ANIO         7 - 15 mEq/L     [facility] DEPARTMENT OF



                                         PATHOLOGY AND



                                         GENOMIC MEDICINE

 

   



                 BUN             27 (H)          8 - 23 mg/dL     [facility] DEPARTMENT OF



                                         PATHOLOGY AND



                                         GENOMIC MEDICINE

 

   



                 Creatinine      1.5 (H)         0.70 - 1.20 mg/dL     [facility] DEPARTMENT OF



                                         PATHOLOGY AND



                                         GENOMIC MEDICINE

 

   



                 Glucose         218 (H)         65 - 99 mg/dL     [facility] DEPARTMENT OF



                                         PATHOLOGY AND



                                         GENOMIC MEDICINE

 

   



                 Calcium         8.1 (L)         8.8 - 10.2 mg/dL     [facility] DEPARTMENT OF



                                         PATHOLOGY AND



                                         GENOMIC MEDICINE













                                         Specimen

 





                                         Plasma specimen









   



                 Performing Organization     Address         City/State/Zipcode     Phone Number

 

   



                     [facility] DEPARTMENT OF     [address] [place] [address] 



                                         PATHOLOGY AND GENOMIC   



                                         MEDICINE   





* EEG (routine) (09/10/2018 10:35 AM CDT)





 



                           Narrative                 Performed At

 

 



                                         This result has an attachment that is not available. 



                                         EEG AWAKE AND DROWSY 



                                         Date of Service: 9/10/18 



                                         Awake Recordings: The occipital dominant rhythm is 6-7 Hz and is poorly 



                                         sustained. Low voltage 4-5 Hz and 18-22 Hz activity was present in all 



                                         regions.1.5-3 Hz activity was recorded in the right frontal central 



                                         temporal region. 



                                         Sleep Recording: No sleep was recorded. 



                                         Hyperventilation: Was not performed. 



                                         Photic Stimulation: Was not performed. 



                                         Impression: The findings are consistent with a diffuse disturbance in 



                                         brain function with a focal lesion in the right frontal central temporal 



                                         region. No seizures occurred. 



                                         ICD-10 Code: R569 





* MRA Neck Wo Contrast (09/10/2018  7:38 AM CDT)





 



                           Narrative                 Performed At

 

 



                           EXAMINATION: MRA NECK WO CONTRAST      [facility]



                                         CLINICAL HISTORY: STROKE. Evaluate for artery stenosis 



                                         COMPARISON:None 



                                         TECHNIQUE: 2-D and 3-D Time-of-flight MRA images of the cervical vessels were 



                                         obtained with multiplanar and 3-D reconstructive algorithms. 



                                         FINDINGS: 



                                         The left internal carotid artery is occluded just distal to its origin with 100%

 



                                         stenosis by NASCET criteria. There is no significant stenosis in the right 



                                         internal carotid artery by NASCET criteria. The left vertebral artery is 



                                         dominant with tiny right 



                                         vertebral artery in the cervical region.There is no significant focal 



                                         stenosis in the vertebral arteries. The common carotid arteries do not show 



                                         significant focal stenosis. 



                                         IMPRESSION: 



                                         The left internal carotid artery is occluded just distal to its origin with 100%

 



                                         stenosis by NASCET criteria. 



                                         [facility]-[id number] 









                                        Procedure Note

 

                                        



[facility], Radiology Results Incoming - 09/10/2018  8:12 AM CDT



EXAMINATION: MRA NECK WO CONTRAST



CLINICAL HISTORY: STROKE. Evaluate for artery stenosis



COMPARISON:  None



TECHNIQUE: 2-D and 3-D Time-of-flight MRA images of the cervical vessels were 
obtained with multiplanar and 3-D reconstructive algorithms.



FINDINGS:



The left internal carotid artery is occluded just distal to its origin with 100%
stenosis by NASCET criteria. There is no significant stenosis in the right 
internal carotid artery by NASCET criteria. The left vertebral artery is 
dominant with tiny right 

vertebral artery in the cervical region.  There is no significant focal stenosis
in the vertebral arteries. The common carotid arteries do not show significant 
focal stenosis.





IMPRESSION:



The left internal carotid artery is occluded just distal to its origin with 100%
stenosis by NASCET criteria. 















[facility]-[id number]











   



                 Performing Organization     Address         City/State/Zipcode     Phone Number

 

   



                     [facility]          [address] [place] [address] 





* MRA Head Wo Contrast (09/10/2018  7:22 AM CDT)





 



                           Narrative                 Performed At

 

 



                           EXAMINATION: MRA HEAD WO CONTRAST     [facility]



                                         CLINICAL HISTORY: STROKE. Evaluate for artery stenosis. 



                                         COMPARISON:None 



                                         TECHNIQUE: Time-of-flight MRA images of the [place] vessels were 



                                         obtained with multiplanar and 3-D reconstructive algorithms. 



                                         FINDINGS: 



                                         There is flow in the anterior and both posterior communicating arteries. The 



                                         distal left internal carotid artery is occluded with a small amount of flow 



                                         beginning just below the posterior communicating artery extending up to the 



                                         bifurcation. The left A1 



                                         segment is smaller than the right. There is no significant focal stenosis in the

 



                                         anterior, middle or posterior cerebral arteries. The study does not include the

 



                                         distal portion of the right vertebral artery which was hypoplastic in the 



                                         cervical region. 



                                         The distal most portion of the left vertebral artery and the basilar artery do 





                                         not show significant focal stenosis. 



                                         IMPRESSION: 



                                         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 





                                         the anterior and posterior circulation. 



                                         [facility]-[id number] 









                                        Procedure Note

 

                                        



[facility], Radiology Results Incoming - 09/10/2018 12:02 PM CDT



EXAMINATION: MRA HEAD WO CONTRAST



CLINICAL HISTORY: STROKE. Evaluate for artery stenosis.



COMPARISON:  None



TECHNIQUE: Time-of-flight MRA images of the [place] vessels were 
obtained with multiplanar and 3-D reconstructive algorithms.



FINDINGS:



There is flow in the anterior and both posterior communicating arteries. The 
distal left internal carotid artery is occluded with a small amount of flow 
beginning just below the posterior communicating artery extending up to the 
bifurcation. The left A1 

segment is smaller than the right. There is no significant focal stenosis in the
anterior, middle or posterior cerebral arteries. The study does not include the 
distal portion of the right vertebral artery which was hypoplastic in the 
cervical region. 

The distal most portion of the left vertebral artery and the basilar artery do 
not show significant focal stenosis.



IMPRESSION:



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 
the anterior and posterior circulation.



[facility]-[id number]











   



                 Performing Organization     Address         City/State/Zipcode     Phone Number

 

   



                     [facility]          [address] [place] [address] 





* MRI Brain Wo Contrast (09/10/2018  7:10 AM CDT)





 



                           Narrative                 Performed At

 

 



                           This result has an attachment that is not available.      [facility]



                                         EXAMINATION: MRI BRAIN WO CONTRAST 



                                         CLINICAL HISTORY: NEURO DEFICITACUTESINGLEPROGRESSING, STROKE 



                                         COMPARISON:CT brain from yesterday. MRI brain from 2018. 



                                         TECHNIQUE: Multiplanar and multisequence MRI imaging of the brain was obtained 





                                         without contrast. 



                                         FINDINGS: 



                                         As seen on the CT exam, there is a recent stroke in the right paracentral 



                                         frontal lobe in the anterior cerebral artery territory. There is localized mass

 



                                         effect. There is no definite evidence of acute hemorrhage in this region. 



                                         There is nonspecific enlargement of the ventricles and extra axial space in the

 



                                         supra and infratentorial region and there are nonspecific white matter changes.

 



                                         There is old infarct with old hemorrhage in the left posterior frontal lobe and

 



                                         left frontal 



                                         operculum. There are some areas of blooming in the right parietal cortex and 



                                         periventricular region consistent with old hemorrhagic products or 



                                         calcification. There is old infarct in the left basal ganglia. There are old 



                                         infarcts or perivascular spaces 



                                         in the thalami. There are old infarcts in the loren and cerebellum. 



                                         There is pseudophakia. 



                                         IMPRESSION: 



                                         Recent infarct in the right anterior cerebral artery territory as seen on the CT

 



                                         exam. 



                                         Old areas of infarction some which have old hemorrhage. 



                                         [facility]-[id number] 









                                        Procedure Note

 

                                        



 Interface, Radiology Results Incoming - 09/10/2018 12:01 PM CDT



EXAMINATION: MRI BRAIN WO CONTRAST



CLINICAL HISTORY: NEURO DEFICIT  ACUTE  SINGLE  PROGRESSING, STROKE



COMPARISON:  CT brain from yesterday. MRI brain from 2018.



TECHNIQUE: Multiplanar and multisequence MRI imaging of the brain was obtained 
without contrast.





FINDINGS:



As seen on the CT exam, there is a recent stroke in the right paracentral 
frontal lobe in the anterior cerebral artery territory. There is localized mass 
effect. There is no definite evidence of acute hemorrhage in this region.



There is nonspecific enlargement of the ventricles and extra axial space in the 
supra and infratentorial region and there are nonspecific white matter changes. 
There is old infarct with old hemorrhage in the left posterior frontal lobe and 
left frontal 

operculum. There are some areas of blooming in the right parietal cortex and 
periventricular region consistent with old hemorrhagic products or 
calcification. There is old infarct in the left basal ganglia. There are old 
infarcts or perivascular spaces 

in the thalami. There are old infarcts in the loren and cerebellum.



There is pseudophakia.



IMPRESSION:



Recent infarct in the right anterior cerebral artery territory as seen on the CT
exam.



Old areas of infarction some which have old hemorrhage.

















[facility]-[id number]  











   



                 Performing Organization     Address         City/State/Zipcode     Phone Number

 

   



                      [facility]          [address] [place] [address] 





* XR Bone Survey Skeletal (2018  4:23 PM CDT)





 



                           Narrative                 Performed At

 

 



                           EXAMINATION:XR BONE SURVEY SKELETAL      [facility]



                                         CLINICAL HISTORY:MRI consent 



                                         COMPARISON:None. 



                                         IMPRESSION: 



                                         1.Skull: Intact. Radiopaque/metallic dentition 



                                         2.Cervical spine: Intact. 



                                         3.Thoracic spine: Intact. 



                                         4.RIBS: Intact. No radiopaque densities in the chest. 



                                         5.Lumbar spine: Intact. 



                                         6.ABDOMEN: Bowel gas pattern is normal. No radiopaque foreign body. 



                                         7.Pelvis: Intact. Atherosclerotic disease involving the abdominal aorta and

 



                                         iliac vessels. 



                                         8.Right humerus: Intact. 



                                         9.Left humerus: Intact. 



                                         10.Bilateral forearm. Intact. No radiopaque metallic foreign body. Small 



                                         vessel atherosclerotic disease. 



                                         11.Right femur: Intact. 



                                         12.Left femur: Distal femoral cortical thickening with a subcortical lucency

 



                                         likely relating to sequela of old trauma and periosteal reaction. In addition, 





                                         there are posttraumatic changes involving the proximal tibia. Calcific density 





                                         along the distal 



                                         tibial and fibular cortex may relate to periostitis or sequela of chronic lower

 



                                         extremity swelling/venous reflux. No radiopaque metallic hardware. 



                                         13.Right femur: Intact. The right tibia and fibula are intact. No radiopaque

 



                                         foreign body. 



                                         14.Overall, there is no radiopaque metallic artifact involving the 



                                         visualized body parts. 



                                         [facility]-[id number] 









                                        Procedure Note

 

                                        



 Interface, Radiology Results Incoming - 2018  5:09 PM CDT



EXAMINATION:  XR BONE SURVEY SKELETAL



CLINICAL HISTORY:  MRI consent



COMPARISON:  None.





IMPRESSION:



                                        1.  Skull: Intact. Radiopaque/metallic dentition



                                        2.  Cervical spine: Intact.



                                        3.  Thoracic spine: Intact.



                                        4.  RIBS: Intact. No radiopaque densities in the chest.



                                        5.  Lumbar spine: Intact.



                                        6.  ABDOMEN: Bowel gas pattern is normal. No radiopaque foreign body.



                                        7.  Pelvis: Intact. Atherosclerotic disease involving the abdominal aorta and iliac

 vessels.



                                        8.  Right humerus: Intact.



                                        9.  Left humerus: Intact.



                                        10.  Bilateral forearm. Intact. No radiopaque metallic foreign body. Small vessel

 atherosclerotic disease.



                                        11.  Right femur: Intact.



                                        12.  Left femur: Distal femoral cortical thickening with a subcortical lucency likely

 relating to sequela of old trauma and periosteal reaction. In addition, there 
are posttraumatic changes involving the proximal tibia. Calcific density along 
the distal 

tibial and fibular cortex may relate to periostitis or sequela of chronic lower 
extremity swelling/venous reflux. No radiopaque metallic hardware.



                                        13.  Right femur: Intact. The right tibia and fibula are intact. No radiopaque foreign

 body.



                                        14.  Overall, there is no radiopaque metallic artifact involving the visualized 

body parts.







[facility]-[id number]











   



                 Performing Organization     Address         City/State/Zipcode     Phone Number

 

   



                      [facility]          [address] [place] [address] 





* Pv duplex venous upper extremity (2018  2:33 PM CDT)





 



                           Narrative                 Performed At

 

 



                                [facility]





                                         Vascular Ultrasound Laboratory 



                                         Upper Extremity Venous Report 



                                          [address].Name:Deedee MOSQUEDA.ID:[id number] 





                                         .Date: 2018Refer.MD:EMMA ALBA MD 



                                         Exam Time: 2:09:00 PMStudy Type:UE 



                                         Venous 



                                         DOBAge:1950,68Y Sex: 



                                         MALE 



                                         Sonogrphr: Man Sow RN, RVTPat. 



                                         Stat.:Inpatient 



                                         [facility] 



                                         GABRIELA, 



                                         CPT - 4: 78736 Echo Event 



                                         ID:[id number] 



                                         Order ID:[id number] 



                                         Reason for Study:Left arm edema. 



                                         Procedures:Colorflow, Grayscale/2D, Pulsed wave Doppler 



                                         Race: 



                                         ------------------------------------ 



                                         SUMMARY: 



                                         ------------------------------------ 



                                         DUPLEX SCAN OBSERVATIONS 



                                          Right Left 



                                         IJ Normal 



                                         SubclavianNormal Normal 



                                         Axillary Normal 



                                         Brachial Normal 



                                         Basilic Normal 



                                         Cephalic Normal 



                                          



                                         LEFT:There is normal compressibility and no evidence of echogenic 



                                         material noted within the lumen of the visualized veins. Colorflow and 



                                         Doppler signals are normal. 



                                         PRELIMINARY FINDINGS 



                                         1.Normal venous duplex scan of the left upper extremity. 



                                         PHYSICIAN INTERPRETATION 



                                         Venous examination of the left upper extremity and neck demonstrated 



                                         no evidence of venous thrombosis. 



                                         Signed 2018 06:39 PM 



                                         Nico Burns MD, RPLEYDA 









                                        Procedure Note

 

                                        



Interface, Radiology Results In - 2018  6:39 PM CDT



                                   

                    Vascular Ultrasound Laboratory

                    Upper Extremity Venous Report

           [address] [place] [address]

                                   

Pat.Name:  FABIÁN MOSQUEDA          Pat.ID:    [id number]               

St.Date:   2018                Refer.MD:  EMMA ALBA MD  

Exam Time: 2:09:00 PM              Study Type:UE Venous               

  Age:  1950,68Y           Sex:       MALE                    

Sonogrphr: Man Sow RN, RVT  Pat. Stat.:Inpatient               

Room:      [address]  Vol: DP,                     

CPT - 4:   03234                   Echo Event ID:[id number]            

Order ID:  [id number]              

Reason for Study:Left arm edema.   

Procedures:Colorflow, Grayscale/2D, Pulsed wave Doppler

Race:                      

                                        ------------------------------------

SUMMARY:

                                        ------------------------------------

DUPLEX SCAN OBSERVATIONS

 

   Right Left

 IJ   Normal

 Subclavian  Normal Normal

 Axillary   Normal

 Brachial   Normal

 Basilic   Normal

 Cephalic   Normal

 

  

 LEFT:  There is normal compressibility and no evidence of echogenic

material noted within the lumen of the visualized veins. Colorflow and

Doppler signals are normal.   

 

 PRELIMINARY FINDINGS

 1.  Normal venous duplex scan of the left upper extremity.

 

 PHYSICIAN INTERPRETATION 

 Venous examination of the left upper extremity and neck demonstrated

no evidence of venous thrombosis.

 

 

 

Signed 2018 06:39 PM

Nico Burns MD, RPVI









   



                 Performing Organization     Address         City/State/Zipcode     Phone Number

 

   



                     [facility]            [address] [place] [address] 





* Pv duplex venous lower extremity (2018  2:33 PM CDT)



Only the most recent of 2 results within the time period is included.





 



                           Narrative                 Performed At

 

 



                                [facility]





                                         Vascular Ultrasound Laboratory 



                                         Lower Extremity Venous Report 



                                          [address] [address], [place], [place] [address].Name:Deedee MOSQUEDA.ID:[id number] 





                                         .Date: 2018Refer.MD:EMMA ALBA MD 



                                         Exam Time: 1:45:00 PMStudy Type:LE 



                                         Venous 



                                         DOBAge:1950,68Y Sex: 



                                         MALE 



                                         Sonogrphr: Man Sow RN, RVTPat. 



                                         Stat.:Inpatient 



                                         [facility] 



                                         GABRIELA, 



                                         CPT - 4: 50555 Echo Event 



                                         ID:[id number] 



                                         Order ID:[id number] 



                                         Reason for Study:Bilateral leg edema.Questionable DVT. 



                                         Procedures:Colorflow, Grayscale/2D, Pulsed wave Doppler 



                                         Race: 



                                         ------------------------------------ 



                                         SUMMARY: 



                                         ------------------------------------ 



                                         DUPLEX SCAN OBSERVATIONS 



                                          Deep VeinsSuperficial Veins 



                                         RightLeft RightLeft 



                                          GSV (prox) NormalNormal 



                                         CFV Normal Normal (above knee) 



                                         Femoral Normal Normal GSV (dist) Normal Not Visualized 



                                         Profunda Normal Normal (below knee) 



                                         Popliteal Normal Not Visualized 



                                         PT (prox) Normal Not visualizedSSV Not Visualized Not Visualized 



                                         PT (dist) Normal Not Visualized 



                                         Peroneal Normal Not Visualized 



                                         RIGHT:There is normal compressibility with no evidence of echogenic 



                                         material noted within the lumen of the visualized veins.Colorflow 



                                         and Doppler signals are normal. 



                                         LEFT:There is normal compressibility with no evidence of echogenic 



                                         material noted within the lumen of the visualized veins.Colorflow 



                                         and Doppler signals are normal. 



                                         PRELIMINARY FINDINGS 



                                         1.No evidence of deep venous thrombosis in the visualized veins. 



                                         PHYSICIAN INTERPRETATION 



                                         Venous examination of the both lower extremities demonstrated no 



                                         evidence of venous thrombosis in the visualized veins.Normal 



                                         compressibility and augmentation of all veins visualized. 



                                         Signed 2018 06:33 PM 



                                         Nico Burns MD, RPVI 









                                        Procedure Note

 

                                        



Interface, Radiology Results In - 2018  6:33 PM CDT



                                   

                    Vascular Ultrasound Laboratory

                    Lower Extremity Venous Report

           [address] [facility] [address] [id number]

                                   

Pat.Name:  FABIÁN MOSQUEDA.ID:    [id number]               

.Date:   2018                Refer.MD:  EMMA ALBA MD  

Exam Time: 1:45:00 PM              Study Type:LE Venous               

  Age:  1950,68Y           Sex:       MALE                    

Sonogrphr: Man Sow RN, RVT  Pat. Stat.:Inpatient               

Room:      [facility]                  Tape  Vol: DP,                     

CPT - 4:   19468                   Echo Event ID:[id number]            

Order ID:  [id number]              

Reason for Study:Bilateral leg edema.  Questionable DVT.

Procedures:Colorflow, Grayscale/2D, Pulsed wave Doppler

Race:                      

                                        ------------------------------------

SUMMARY:

                                        ------------------------------------

 DUPLEX SCAN OBSERVATIONS

 

   Deep Veins  Superficial Veins

  Right  Left Right  Left

     GSV (prox) Normal  Normal

 CFV Normal Normal (above knee)

 Femoral Normal Normal GSV (dist) Normal Not Visualized

 Profunda Normal Normal (below knee)

 Popliteal Normal Not Visualized

 PT (prox) Normal Not visualized  SSV Not Visualized Not Visualized

 PT (dist) Normal Not Visualized 

 Peroneal Normal Not Visualized 

 

 RIGHT:  There is normal compressibility with no evidence of echogenic

material noted within the lumen of the visualized veins.  Colorflow

and Doppler signals are normal.

 

 LEFT:  There is normal compressibility with no evidence of echogenic

material noted within the lumen of the visualized veins.  Colorflow

and Doppler signals are normal.   

 

 PRELIMINARY FINDINGS

 1.  No evidence of deep venous thrombosis in the visualized veins.

 

 PHYSICIAN INTERPRETATION 

 Venous examination of the both lower extremities demonstrated no

evidence of venous thrombosis in the visualized veins.  Normal

compressibility and augmentation of all veins visualized.

 

Signed 2018 06:33 PM

Nico Burns MD, VI









   



                 Performing Organization     Address         City/State/Zipcode     Phone Number

 

   



                      [facility]            [address] [id number] 





* CT Head Wo Contrast (2018 10:27 AM CDT)



Only the most recent of 2 results within the time period is included.





 



                           Narrative                 Performed At

 

 



                           EXAMINATION: CT HEAD WO CONTRAST     [facility]



                                         CLINICAL HISTORY: Neuro deficit(s)subacute, Altered level of consciousness 





                                         (LOC)unexplained 



                                         COMPARISON:CT brain from yesterday. MRI the brain from 2017. 



                                         TECHNIQUE: Noncontrast enhanced images of the brain were obtained from the skull

 



                                         base to the vertex. Both soft tissue and bone reconstruction algorithms were 



                                         performed.CT scans are performed using radiation dose reduction techniques.

 



                                         Technical factors 



                                         are evaluated and adjusted to ensure appropriate moderation of exposure. 



                                         Automated dose management technology is applied to adjust radiation exposure 



                                         while achieving a diagnostic quality image. 



                                         FINDINGS: 



                                         Artifacts obscure details. 



                                         There is a large recent infarct throughout the paracentral right frontal lobe in

 



                                         the anterior cerebral artery distribution with cytotoxic and vasogenic edema and

 



                                         localized mass effect. There is no evidence of acute hemorrhage. 



                                         There are chronic changes in the rest of the brain including old infarcts which

 



                                         are stable. There is calcification in the walls of some of the arteries. There 





                                         is nonspecific increased bone density. The nasal septum is slightly deviated. 



                                         IMPRESSION: 



                                         New subacute density stroke in the right anterior cerebral artery territory. 



                                         Findings discussed with the patient's nurse Gabriela at 1031 hours. She 



                                         verbalized understanding. 



                                         [facility]-[id number] 









                                        Procedure Note

 

                                        



Hm Interface, Radiology Results Incoming - 2018 10:36 AM CDT



EXAMINATION: CT HEAD WO CONTRAST



CLINICAL HISTORY: Neuro deficit(s)  subacute, Altered level of consciousness 
(LOC)  unexplained



COMPARISON:  CT brain from yesterday. MRI the brain from 2017.



TECHNIQUE: Noncontrast enhanced images of the brain were obtained from the skull
base to the vertex. Both soft tissue and bone reconstruction algorithms were 
performed.  CT scans are performed using radiation dose reduction techniques. 
Technical factors 

are evaluated and adjusted to ensure appropriate moderation of exposure. 
Automated dose management technology is applied to adjust radiation exposure 
while achieving a diagnostic quality image.





FINDINGS:



Artifacts obscure details.



There is a large recent infarct throughout the paracentral right frontal lobe in
the anterior cerebral artery distribution with cytotoxic and vasogenic edema and
localized mass effect. There is no evidence of acute hemorrhage. 



There are chronic changes in the rest of the brain including old infarcts which 
are stable. There is calcification in the walls of some of the arteries. There 
is nonspecific increased bone density. The nasal septum is slightly deviated.





IMPRESSION:



New subacute density stroke in the right anterior cerebral artery territory.



Findings discussed with the patient's nurse Gabriela at 1031 hours. She 
verbalized understanding.









[facility]-[id number]











   



                 Performing Organization     Address         City/[facility]     Phone Number

 

   



                     [facility]          [address] [id number] 





* Vitamin D 25 hydroxy level (2018  8:59 AM CDT)





   



                 Component       Value           Ref Range       Performed At

 

   



                 Vitamin D, 25-hydroxy     9.4 (L)         30.0 - 150.0 ng/mL     [facility] DEPARTMENT OF



                           Comment:                  PATHOLOGY AND



                           This assay reports the sum of      [employer] [facility]



                                         25-hydroxy vitamin D3 and  



                                         25-hydroxy vitamin  



                                         D2.  



                                         Reference range:  



                                         0-17 years:  



                                         Deficiency: less than 20ng/mL  



                                         Optimum level: greater than or  



                                         equal to 20 ng/mL.  



                                         18 years and older:  



                                         Deficiency: less than 20ng/mL  



                                         Insufficiency: 20-29 ng/mL  



                                         Optimum Level: 30-80 ng/mL  



                                         The assay reportable range is  



                                         3.4155.9 ng/mL. Levels  



                                         higher than 150  



                                         ng/mL may be associated with  



                                         toxicity.  



                                         If toxicity is clinically  



                                         suspected and the reported  



                                         result is >155.9  



                                         ng/mL,contact lab for  



                                         alternative methods to obtain  



                                         a definitivelevel.  



                                         If separate quantitation of  



                                         25-hydroxy vitamin D3 and  



                                         25-hydroxy vitamin D2  



                                         is needed, please contact lab  



                                         for alternative methods.  













                                         Specimen

 





                                         Blood









   



                 Performing Organization     Address         City/[facility]     Phone Number

 

   



                     [place] [phone] 



                                         PATHOLOGY AND GENOMIC   



                                         MEDICINE   





* Homocystine, plasma (2018  1:43 AM CDT)





   



                 Component       Value           Ref Range       Performed At

 

   



                 Homocysteine     8.7             0.0 - 15.0 umol/L     [facility] DEPARTMENT OF



                           Comment:                  PATHOLOGY AND



                           The risk for coronary vascular      GENOMIC MEDICINE



                                         disease increases  



                                         progressively  



                                         with homocysteine  



                                         concentration.A 3.4 times  



                                         greater risk is  



                                         associated with a homocysteine  



                                         concentration of greater  



                                         than  



                                         15.8 umol/L as compared to a  



                                         concentration below 14.1  



                                         umol/L.  













                                         Specimen

 





                                         Plasma specimen









   



                 Performing Organization     Address         City/[facility]/Zipcode     Phone Number

 

   



                     [address] [address] 



                                         PATHOLOGY AND GENOMIC   



                                         MEDICINE   





* Rheumatoid factor (2018  1:43 AM CDT)





   



                 Component       Value           Ref Range       Performed At

 

   



                 Rheumatoid factor     <10             0 - 13 IU/mL     [facility] DEPARTMENT OF



                                         PATHOLOGY AND



                                         GENOMIC MEDICINE













                                         Specimen

 





                                         Plasma specimen









   



                 Performing Organization     Address         City/[facility]     Phone Number

 

   



                     [place] [address] 



                                         PATHOLOGY AND GENOMIC   



                                         MEDICINE   





* C-reactive protein (2018  1:43 AM CDT)



Only the most recent of 2 results within the time period is included.





   



                 Component       Value           Ref Range       Performed At

 

   



                 CRP             12.44 (H)       0.00 - 0.50 mg/dL     [facility] DEPARTMENT OF



                                         PATHOLOGY AND



                                         GENOMIC MEDICINE













                                         Specimen

 





                                         Plasma specimen









   



                 Performing Organization     Address         City/[facility]     Phone Number

 

   



                     [facility] [place] [address] 



                                         PATHOLOGY AND GENOMIC   



                                         MEDICINE   





* Thyroid stimulating hormone (2018  1:43 AM CDT)





   



                 Component       Value           Ref Range       Performed At

 

   



                 TSH             1.92            0.27 - 4.20 uIU/mL     [facility] DEPARTMENT OF



                                         PATHOLOGY AND



                                         [facility] MEDICINE













                                         Specimen

 





                                         Plasma specimen









   



                 Performing Organization     Address         City/[facility]     Phone Number

 

   



                     [place] [address] 



                                         PATHOLOGY AND GENOMIC   



                                         MEDICINE   





* T4, free (2018  1:43 AM CDT)





   



                 Component       Value           Ref Range       Performed At

 

   



                 T4, free        1.2             0.9 - 1.7 ng/dL     [facility] DEPARTMENT OF



                                         PATHOLOGY AND



                                         GENOMIC MEDICINE













                                         Specimen

 





                                         Plasma specimen









   



                 Performing Organization     Address         City/[facility]     Phone Number

 

   



                     [place] [address] 



                                         PATHOLOGY AND [facility]   



                                         MEDICINE   





* Vitamin B12 level (2018  1:43 AM CDT)





   



                 Component       Value           Ref Range       Performed At

 

   



                 Vitamin B12     426             211 - 946 pg/mL     [facility] DEPARTMENT OF



                           Comment:                  PATHOLOGY AND



                           Significant overlap [facility]      GENOMIC MEDICINE



                                         between normal and deficiency  



                                         states.  



                                         However, most patients with  



                                         deficiencies will have Serum  



                                         B12  



                                         <200  



                                         pg/mL.  



                                           



                                           



                                           













                                         Specimen

 





                                         Serum









   



                 Performing Organization     Address         City/[facility]     Phone Number

 

   



                     [facility] [place] [address] 



                                         PATHOLOGY AND GENOMIC   



                                         MEDICINE   





* Lipid panel (2018  1:43 AM CDT)





   



                 Component       Value           Ref Range       Performed At

 

   



                 Cholesterol     109             <200 mg/dL      [facility] DEPARTMENT OF



                                         PATHOLOGY AND



                                         GENOMIC MEDICINE

 

   



                 Triglycerides     84              <150 mg/dL      [facility] DEPARTMENT OF



                                         PATHOLOGY AND



                                         GENOMIC MEDICINE

 

   



                 HDL cholesterol     33 (L)          >40 mg/dL       [facility] DEPARTMENT OF



                                         PATHOLOGY AND



                                         GENOMIC MEDICINE

 

   



                 LDL cholesterol     61Comment: Result obtained by     <100 mg/dL      [facility] DEPARTMENT OF





                           direct LDL measurement      PATHOLOGY AND



                                         GENOMIC MEDICINE

 

   



                     Lipid panel         SeeBelow            [facility] DEPARTMENT OF



                     interpretation      Comment:            PATHOLOGY AND



                           Total Cholesterol         GENOMIC MEDICINE



                                         (mg/dL)  



                                         <200  



                                         Desirable  



                                         200-239Borderline  



                                         -high  



                                         >=240High  



                                           



                                           



                                           



                                         Triglycerides (mg/dL)  



                                         <150 Normal  



                                         150-199Borderline  



                                         -high  



                                         200-499High  



                                         >=500Very  



                                         high  



                                         HDL Cholesterol  



                                         (mg/dL)  



                                         <40Low  



                                         (male)  



                                         <40Low  



                                         (female)  



                                         LDL Cholesterol (mg/dL)  



                                         <100 Optimal  



                                         100-129Near or  



                                         above optimal  



                                         130-159Borderline  



                                         -high  



                                         160-189High  



                                         >=190Very  



                                         high  



                                           



                                           



                                           



                                           



                                         Risk Catergories that modify  



                                         LDL goals.  



                                         Risk  



                                         Catergories  



                                         LDL goal (mg/dL)  



                                         CHD and CHD risk  



                                         equivalent<100  



                                         (10-year risk >20%)  



                                         Multiple (2+) risk  



                                         factors <130  



                                         (10-year risk=<20%)  



                                         0-1 risk  



                                         factors  



                                          <160  



                                         (<10-year  



                                         risk)  



                                           



                                         Defining levels of lipids in  



                                         metabolic syndrome  



                                         Triglycerides  



                                         >=150  



                                         mg/dL  



                                         HDL  



                                         Cholesterol  



                                           



                                         Men  



                                           



                                         <40 mg/dL  



                                         Women  



                                           



                                         <40 mg/dL  



                                         Non-HDL cholesterol is a  



                                         second target for therapy in  



                                         persons  



                                         with high triglycerides (>=200  



                                         mg/dL)  













                                         Specimen

 





                                         Plasma specimen









   



                 Performing Organization     Address         City/State/Zipcode     Phone Number

 

   



                     [facility] DEPARTMENT OF     [address] [place] [address] 



                                         PATHOLOGY AND GENOMIC   



                                         MEDICINE   





* Comprehensive metabolic panel (2018  1:43 AM CDT)



Only the most recent of 4 results within the time period is included.





   



                 Component       Value           Ref Range       Performed At

 

   



                 Sodium          138             135 - 148 mEq/L     [facility] DEPARTMENT OF



                                         PATHOLOGY AND



                                         GENOMIC MEDICINE

 

   



                 Potassium       3.6             3.5 - 5.0 mEq/L     [facility] DEPARTMENT OF



                                         PATHOLOGY AND



                                         GENOMIC MEDICINE

 

   



                 Chloride        98              98 - 112 mEq/L     [facility] DEPARTMENT OF



                                         PATHOLOGY AND



                                         GENOMIC MEDICINE

 

   



                 CO2             26              24 - 31 mEq/L     [facility] DEPARTMENT OF



                                         PATHOLOGY AND



                                         GENOMIC MEDICINE

 

   



                 Anion gap       14@ANIO         7 - 15 mEq/L     [facility] DEPARTMENT OF



                                         PATHOLOGY AND



                                         GENOMIC MEDICINE

 

   



                 BUN             25 (H)          8 - 23 mg/dL     [facility] DEPARTMENT OF



                                         PATHOLOGY AND



                                         GENOMIC MEDICINE

 

   



                 Creatinine      1.5 (H)         0.70 - 1.20 mg/dL     [facility] DEPARTMENT OF



                                         PATHOLOGY AND



                                         GENOMIC MEDICINE

 

   



                 Glucose         257 (H)         65 - 99 mg/dL     [facility] DEPARTMENT OF



                                         PATHOLOGY AND



                                         GENOMIC MEDICINE

 

   



                 Calcium         8.2 (L)         8.8 - 10.2 mg/dL     [facility] DEPARTMENT OF



                                         PATHOLOGY AND



                                         GENOMIC MEDICINE

 

   



                 Protein         6.4             6.3 - 8.3 g/dL     [facility] DEPARTMENT OF



                           Comment:                  PATHOLOGY AND



                                 GENOMIC MEDICINE



                                          4.6-7.0 g/dL  



                                         1  



                                         week  



                                          4.4-7.6 g/dL  



                                         7  



                                         months-1year  



                                         5.1-7.3 g/dL  



                                         1-2  



                                         years5.6-7  



                                         .5 g/dL  



                                         >3  



                                         years6.0-8  



                                         .0 g/dL  



                                           



                                          6.3-8.3 g/dL  

 

   



                 Albumin         1.9 (L)         3.5 - 5.0 g/dL     [facility] DEPARTMENT OF



                                         PATHOLOGY AND



                                         GENOMIC MEDICINE

 

   



                 A/G ratio       0.4 (L)         0.7 - 3.8       [facility] DEPARTMENT OF



                                         PATHOLOGY AND



                                         GENOMIC MEDICINE

 

   



                 Alkaline phosphatase     71              40 - 129 U/L     [facility] DEPARTMENT OF



                                         PATHOLOGY AND



                                         GENOMIC MEDICINE

 

   



                 AST             91 (H)          10 - 50 U/L     [facility] DEPARTMENT OF



                                         PATHOLOGY AND



                                         GENOMIC MEDICINE

 

   



                 ALT             19              5 - 50 U/L      [facility] DEPARTMENT OF



                                         PATHOLOGY AND



                                         GENOMIC MEDICINE

 

   



                 Total bilirubin     0.7             0.0 - 1.2 mg/dL     [facility] DEPARTMENT OF



                                         PATHOLOGY AND



                                         GENOMIC MEDICINE













                                         Specimen

 





                                         Plasma specimen









   



                 Performing Organization     Address         City/[facility]     Phone Number

 

   



                     [place] [address] 



                                         PATHOLOGY AND GENOMIC   



                                         MEDICINE   





* Sedimentation rate (2018  1:20 AM CDT)



Only the most recent of 2 results within the time period is included.





   



                 Component       Value           Ref Range       Performed At

 

   



                 Sedimentation rate     46 (H)          0 - 10 mm/hr     [facility] DEPARTMENT OF



                                         PATHOLOGY AND



                                         GENOMIC MEDICINE













                                         Specimen

 





                                         Blood









   



                 Performing Organization     Address         City/[facility]     Phone Number

 

   



                     [place] [address] 



                                         PATHOLOGY AND GENOMIC   



                                         MEDICINE   





* Hemoglobin A1c (2018  1:20 AM CDT)





   



                 Component       Value           Ref Range       Performed At

 

   



                 Hemoglobin A1C     10.1 (H)        4.0 - 5.6 %     [facility] DEPARTMENT OF



                           Comment:                  PATHOLOGY AND



                           HbA1c cutoffs for diagnosing      GENOMIC MEDICINE



                                         diabetes:  



                                         4.0% - 5.6%=normal  



                                         5.7% - 6.4%=increased risk for  



                                         diabetes (prediabetes)

  



                                         >=6.5%=diabetes  



                                         Goals for glycemic control  



                                         (ADA 2016)  



                                         < 7.0%Target for  



                                         nonpregnant adults with  



                                         diabetes.  



                                         More or less stringent targets  



                                         may be appropriate for  



                                         individual patients.  



                                         <7.5% Target for Children  



                                         and adolescents with type 1  



                                         diabetes.  













                                         Specimen

 





                                         Blood









   



                 Performing Organization     Address         City/[facility]     Phone Number

 

   



                     [place] [address] 



                                         PATHOLOGY AND GENOMIC   



                                         MEDICINE   





* Folate RBC (group test) (2018  1:20 AM CDT)





   



                 Component       Value           Ref Range       Performed At

 

   



                 RBC folate      1,027           499 - 1,504 ng/mL     [facility] DEPARTMENT OF



                                         PATHOLOGY AND



                                         GENOMIC MEDICINE













                                         Specimen

 





                                         Blood









   



                 Performing Organization     Address         City/[facility]     Phone Number

 

   



                     [facility] DEPARTMENT OF     [address] [place] [address] 



                                         PATHOLOGY AND GENOMIC   



                                         MEDICINE   





* Prothrombin time with INR (2018  6:40 PM CDT)



Only the most recent of 2 results within the time period is included.





   



                 Component       Value           Ref Range       Performed At

 

   



                 Prothrombin time     15.5 (H)        12.0 - 15.0 sec     [facility] DEPARTMENT OF



                                         PATHOLOGY AND



                                         GENOMIC MEDICINE

 

   



                     INR                 1.2                 [facility] DEPARTMENT OF



                           Comment:                  PATHOLOGY AND



                           The International Normalized      GENOMIC MEDICINE



                                         Ratio (INR) is a therapeutic  



                                         monitoring tool for patients  



                                         who are stable on oral  



                                         anticoagulant therapy. An INR  



                                         of 2.0-3.0 is suggested for  



                                         deep  



                                         vein thrombosis/pulmonary  



                                         embolism.  













                                         Specimen

 





                                         Blood









   



                 Performing Organization     Address         City/[facility]     Phone Number

 

   



                     [facility] [place] [address] 



                                         PATHOLOGY AND GENOMIC   



                                         MEDICINE   





* Blood culture, aerobic & anaerobic (2018  6:35 PM CDT)



Only the most recent of 3 results within the time period is included.





   



                 Component       Value           Ref Range       Performed At

 

   



                     Blood culture isolate     No growth after 5 days of      [facility] DEPARTMENT OF



                           incubation.               PATHOLOGY AND



                           Comment:                  GENOMIC MEDICINE



                                         Specimen Information  



                                         Specimen Source: Blood  



                                         Specimen Site: Wrist Right  













                                         Specimen

 





                                         Blood









   



                 Performing Organization     Address         City/[facility]     Phone Number

 

   



                     [facility] [place] [address] 



                                         PATHOLOGY AND GENOMIC   



                                         MEDICINE   





* ECG 12 lead (2018  2:07 PM CDT)





   



                 Component       Value           Ref Range       Performed At

 

   



                     Ventricular rate     82                  [facility]

 

   



                     Atrial rate         75                  [facility]

 

   



                     QRSD interval       88                  [facility]

 

   



                     QT interval         450                 [facility]

 

   



                     QTC interval        525                 [facility]

 

   



                     QRS axis 1          108                 [facility]

 

   



                     T wave axis         63                  [facility]

 

   



                     EKG impression      Atrial fibrillation-Rightward      [facility]



                                         axis-Nonspecific ST  



                                         abnormality , probably  



                                         digitalis effect-Prolonged  



                                         QT-Abnormal ECG-In automated  



                                         comparison with ECG of  



                                         30-OCT-2017 23:26,-Atrial  



                                         fibrillation has replaced  



                                         Sinus rhythm-Nonspecific T  



                                         wave abnormality no  



                                         longer evident in Lateral  



                                         leads-Electronically Signed By  



                                         Rigoberto Eugene MD ([id number]) on  



                                         2018 9:34:08 PM  









   



                 Performing Organization     Address         City/State/Zipcode     Phone Number

 

   



                     [facility]            [address] [place] [address] 





* Echocardiogram complete w contrast and 3D if needed (2018 12:20 PM CDT)





 



                           Narrative                 Performed At

 

 



                                [facility]





                                         Echocardiography Report 



                                          [address] [place], [facility], [place] [address] 



                                          



                                         Pat.Name:Deedee MOSQUEDA.ID:[id number] 





                                         .Date: 2018Refer.MD:EMMA ALBA MD 



                                         Exam Time: 11:41:00 AM Study Type:Routine 



                                         Echo 



                                         Height:72inBSA: 2.18

 



                                         m2 



                                         DOBAge:1950,68Y Sex: 



                                         MALE 



                                         BP:188/86HR:

 



                                         74 bpm 



                                         Sonogrphr: JOSEF Cavanaugh 



                                         Pat. Stat.:Inpatient 



                                         Room:[address] Study 



                                         Status:Final 



                                         Echo Event ID:[id number] 



                                         Order ID:[id number] 



                                         Reason for Study: 



                                         Etiology 



                                         - Symptoms or conditions potentially related to suspected cardiac 



                                         etiology including but not limited to chest pain, shortness of breath, 



                                         palpitations, TIA, stroke, or peripheral embolic event 



                                         ,Atrial fibrillation 



                                         History / Clinical:Diabetes, Hypertension, CVA 



                                         Procedures:2D Echo, Colorflow Doppler, Strain, Intravenous Optison 



                                         Contrast 



                                         Race:C 



                                         ------------------------------------ 



                                         SUMMARY: 



                                         ------------------------------------ 



                                         Concentric left ventricular remodeling. 



                                         Estimated EF is 55%. 



                                         LA volume is moderately to severely enlarged. 



                                         Mild mitral regurgitation. 



                                         LV filling pressure is elevated. 



                                         ------------------------------------ 



                                         FINDINGS: 



                                         ------------------------------------ 



                                         LV: LV size is normal. Concentric left ventricular remodeling. 



                                         LVEF is normal. Estimated EF is 55%. Overall wall motion

 



                                         isnormal. 



                                         RV: RV size is normal. RV systolic function is normal. 



                                         LA: LA volume is moderately to severely enlarged. 



                                         RA: RA size is normal. 



                                         AO: Aortic root diameter is normal. 



                                         RONAN: No pericardial effusion. 



                                         AV: No structural AV abnormalities noted. 



                                         MV: No structural MV abnormalities noted. Mild mitral 



                                         regurgitation. 



                                         PV: No structural PV abnormalities noted. Mild pulmonic 



                                         regurgitation. 



                                         TV: No structural TV abnormalities noted. A trace of tricuspid 



                                         regurgitation 



                                         Ross: LV relaxation is impaired. LV filling pressure is elevated. 



                                         Other:Insufficient TR jet to estimate PA systolic pressure. 



                                         ------------------------------------ 



                                         MEASUREMENTS: 



                                         ------------------------------------ 



                                         2D 



                                         Parasternal Long Axis 



                                         LA Ds4.2 



                                         cmLV%fs 28.5 % 



                                         LVOT 2.3 



                                         cmIVSd 1.2 cm 



                                         Ao An2.6 



                                         cmLVPWd1.2 cm 



                                         Ao Rtd 3.9 



                                         cmIndex1.8 



                                         cm/m 



                                         LV Utve537 g(122-174) 



                                         LVIDd4.6 



                                         cmIndex2.1 



                                         cm/m 



                                         LVM Index 94.5 g/m2 



                                         LVIDs3.3 



                                         cmRWT0.5 



                                         LA Sng Plane 



                                         LA Area 27 cm2(8.8-23.4) LA Vol 103.7 



                                         ml 



                                         Index47.6 ml/m 



                                         LA LngAx 5.4 cm 



                                         DOPPLER 



                                         LVOT For Flow 



                                         LVOT Area4.2 cm2 LVOTmnPG 



                                         1.5 mmHg 



                                         LVOTpkVel 89.5 cm/sLVOT TVI17.1

 



                                         cm 



                                         LVOTpkPG 3.2 mmHgLVOT SV 



                                         71.2 ml 



                                         Mitral Valve 



                                         MV Dec T 168 msec 



                                         Signed 2018 02:23 PM 



                                         Jose Cleary M.D. 









                                        Procedure Note

 

                                        



Interface, Radiology Results In - 2018  2:24 PM CDT



                                   

                        Echocardiography Report

           [address].Name:  FABIÁN MOSQUEDA.ID:    [id number]               

.Date:   2018                Refer.MD:  EMMA ALBA MD     

Exam Time: 11:41:00 AM             Study Type:Routine Echo            

Height:    72in                    BSA:       2.18 m2                 

  Age:  1950,68Y           Sex:       MALE                    

BP:        188/86                  HR:        74 bpm                  

Sonogrphr: JOSEF Cavanaugh. Stat.:Inpatient               

Room:      [address]                 Study Status:Final                 

Echo Event ID:[id number]            

Order ID:  [id number]              

Reason for Study:

Etiology

                                        - Symptoms or conditions potentially related to suspected cardiac

etiology including but not limited to chest pain, shortness of breath,

palpitations, TIA, stroke, or peripheral embolic event

,Atrial fibrillation

History / Clinical:Diabetes, Hypertension, CVA

Procedures:2D Echo, Colorflow Doppler, Strain, Intravenous Optison

Contrast

Race:      C                       

                                        ------------------------------------

SUMMARY:

                                        ------------------------------------

Concentric left ventricular remodeling.

 Estimated EF is 55%.

 LA volume is moderately to severely enlarged.

 Mild mitral regurgitation.

 LV filling pressure is elevated.

                                        ------------------------------------

FINDINGS:

                                        ------------------------------------

LV:       LV size is normal. Concentric left ventricular remodeling.

          LV  EF is normal. Estimated EF is 55%. Overall wall motion

          is  normal.

RV:       RV size is normal. RV systolic function is normal.

LA:       LA volume is moderately to severely enlarged.

RA:       RA size is normal.

AO:       Aortic root diameter is normal.

RONAN:     No pericardial effusion.

AV:       No structural AV abnormalities noted.

MV:       No structural MV abnormalities noted. Mild mitral

          regurgitation. 

PV:       No structural PV abnormalities noted. Mild pulmonic

          regurgitation. 

TV:       No structural TV abnormalities noted. A trace of tricuspid

          regurgitation 

Ross:     LV relaxation is impaired. LV filling pressure is elevated.

Other:    Insufficient TR jet to estimate PA systolic pressure.

                                        ------------------------------------

MEASUREMENTS:

                                        ------------------------------------

                                  2D

Parasternal Long [place]

 LA Ds            4.2 cm            LV%fs           28.5 %   

 LVOT             2.3 cm            IVSd             1.2 cm  

 Ao An            2.6 cm            LVPWd            1.2 cm  

 Ao Rtd           3.9 cm            Index        1.8 cm/m            

 LV Mass          206 g    (122-174)

 LVIDd            4.6 cm            Index        2.1 cm/m            

 LVM Index       94.5 g/m2

 LVIDs            3.3 cm            RWT              0.5     

LA Sng Plane

 LA Area           27 cm2  (8.8-23.4) LA Vol         103.7 ml  

 Index        47.6 ml/m

 LA LngAx         5.4 cm           

                                DOPPLER

LVOT For Flow

 LVOT Area        4.2 cm2           LVOTmnPG         1.5 mmHg

 LVOTpkVel       89.5 cm/s          LVOT TVI        17.1 cm  

 LVOTpkPG         3.2 mmHg          LVOT SV         71.2 ml  

Mitral Valve  

 MV Dec T         168 msec          

 

Signed 2018 02:23 PM

Jose Cleary M.D.









   



                 Performing Organization     Address         City/State/Zipcode     Phone Number

 

   



                      [facility]            [address] [place] [address] 





* XR Chest 2 Vw (2018  8:17 AM CDT)





 



                           Narrative                 Performed At

 

 



                           EXAMINATION:XR CHEST 2 VW      [facility]



                                         CLINICAL HISTORY:Chest painACS suspected 



                                         COMPARISON:2018 



                                         Technique:PA and lateral chest radiographs are obtained. 



                                         IMPRESSION: 



                                         1.There is ill-defined opacity in the medial right lower lobe, and a right 





                                         posterior small-moderate pleural effusion, most likely representing pneumonia. 





                                         Consider chest CT to exclude an underlying mass. The left lung is clear. 



                                         2.There is no pneumothorax. 



                                         3.Heart size is upper limit of normal. 



                                         4.There is no significant skeletal abnormality. 



                                         [id number] 









                                        Procedure Note

 

                                        



 Interface, Radiology Results Incoming - 2018  8:22 AM CDT



EXAMINATION:  XR CHEST 2 VW



CLINICAL HISTORY:  Chest pain  ACS suspected



COMPARISON:  2018



Technique:  PA and lateral chest radiographs are obtained.



IMPRESSION:

                                        1.  There is ill-defined opacity in the medial right lower lobe, and a right posterior

 small-moderate pleural effusion, most likely representing pneumonia. Consider 
chest CT to exclude an underlying mass. The left lung is clear.

                                        2.  There is no pneumothorax.

                                        3.  Heart size is upper limit of normal.

                                        4.  There is no significant skeletal abnormality.







[id number]











   



                 Performing Organization     Address         City/[facility]/Zipcode     Phone Number

 

   



                     [place] [id number] [place] [id number] 





* Urinalysis screen and microscopy, with reflex to culture (2018  1:18 AM 
  CDT)





   



                 Component       Value           Ref Range       Performed At

 

   



                     Specimen site       [place]               [facility] DEPARTMENT OF



                                         PATHOLOGY AND



                                         GENOMIC MEDICINE

 

   



                     Color, UA           Yellow              [facility] DEPARTMENT OF



                                         PATHOLOGY AND



                                         GENOMIC MEDICINE

 

   



                     Appearance, UA      Clear               [facility] DEPARTMENT OF



                                         PATHOLOGY AND



                                         GENOMIC MEDICINE

 

   



                 Specific gravity, UA     1.018           1.001 - 1.035     [facility] DEPARTMENT OF



                                         PATHOLOGY AND



                                         GENOMIC MEDICINE

 

   



                 pH, UA          7.0             5.0 - 8.5       [facility] DEPARTMENT OF



                                         PATHOLOGY AND



                                         GENOMIC MEDICINE

 

   



                 Protein, UA     3+ (A)          Negative        [facility] DEPARTMENT OF



                                         PATHOLOGY AND



                                         GENOMIC MEDICINE

 

   



                 Glucose, UA     3+ (A)          Negative        [facility] DEPARTMENT OF



                                         PATHOLOGY AND



                                         GENOMIC MEDICINE

 

   



                 Ketones, UA     Trace (A)       Negative        [facility] DEPARTMENT OF



                                         PATHOLOGY AND



                                         GENOMIC MEDICINE

 

   



                 Bilirubin, UA     Negative        Negative        [facility] DEPARTMENT OF



                                         PATHOLOGY AND



                                         GENOMIC MEDICINE

 

   



                 Blood, UA       Moderate (A)     Negative        [facility] DEPARTMENT OF



                                         PATHOLOGY AND



                                         GENOMIC MEDICINE

 

   



                 Nitrite, UA     Negative        Negative        [facility] DEPARTMENT OF



                                         PATHOLOGY AND



                                         GENOMIC MEDICINE

 

   



                 Urobilinogen, UA     <2.0            <2.0            [facility] DEPARTMENT OF



                                         PATHOLOGY AND



                                         GENOMIC MEDICINE

 

   



                 Leukocyte esterase, UA     Negative        Negative        [facility] DEPARTMENT OF



                                         PATHOLOGY AND



                                         GENOMIC MEDICINE

 

   



                 Epithelial cells, UA     <1              /HPF            [facility] DEPARTMENT OF



                                         PATHOLOGY AND



                                         GENOMIC MEDICINE

 

   



                 WBC, UA         3 (H)           0 - 1 /HPF      [facility] DEPARTMENT OF



                                         PATHOLOGY AND



                                         GENOMIC MEDICINE

 

   



                 RBC, UA         26 (H)          0 - 5 /HPF      [facility] DEPARTMENT OF



                                         PATHOLOGY AND



                                         GENOMIC MEDICINE

 

   



                 Bacteria, UA     None seen       None seen       [facility] DEPARTMENT OF



                                         PATHOLOGY AND



                                         GENOMIC MEDICINE

 

   



                     Yeast, UA           None seen           [facility] DEPARTMENT OF



                                         PATHOLOGY AND



                                         GENOMIC MEDICINE

 

   



                     Yeast with pseudohyphae,     None seen           [facility] DEPARTMENT OF



                                                   PATHOLOGY AND



                                         GENOMIC MEDICINE

 

   



                 Hyaline casts, UA     1               /LPF            [facility] DEPARTMENT OF



                                         PATHOLOGY AND



                                         GENOMIC MEDICINE













                                         Specimen

 





                                         Urine









   



                 Performing Organization     Address         City/[facility]     Phone Number

 

   



                     [address] [address] 



                                         PATHOLOGY AND GENOMIC   



                                         MEDICINE   





* Urine culture (2018  1:18 AM CDT)





   



                 Component       Value           Ref Range       Performed At

 

   



                     Urine culture       SEE COMMENTComment:      [facility] DEPARTMENT OF



                           Bacteriuria screen negative.      PATHOLOGY AND



                                         GENOMIC MEDICINE









   



                 Performing Organization     Address         City/[facility]/Zipcode     Phone Number

 

   



                     [facility]     [address] [place] [id number] 



                                         PATHOLOGY AND GENOMIC   



                                         MEDICINE   





* Manual differential (2018  5:30 AM CST)



Only the most recent of 2 results within the time period is included.





   



                 Component       Value           Ref Range       Performed At

 

   



                     Manual differential     PERFORMED           [facility] DEPARTMENT OF



                                         PATHOLOGY AND



                                         GENOMIC MEDICINE

 

   



                 Neutrophils     56.0            39.0 - 69.0 %     [facility] DEPARTMENT OF



                                         PATHOLOGY AND



                                         GENOMIC MEDICINE

 

   



                 Lymphocytes     28.0            25.0 - 45.0 %     [facility] DEPARTMENT OF



                                         PATHOLOGY AND



                                         GENOMIC MEDICINE

 

   



                 Monocytes       9.0             0.0 - 10.0 %     [facility] DEPARTMENT OF



                                         PATHOLOGY AND



                                         GENOMIC MEDICINE

 

   



                 Eosinophils     7.0 (H)         0.0 - 5.0 %     [facility] DEPARTMENT OF



                                         PATHOLOGY AND



                                         GENOMIC MEDICINE

 

   



                 Basophils       0.0             0.0 - 1.0 %     [facility] DEPARTMENT OF



                                         PATHOLOGY AND



                                         GENOMIC MEDICINE

 

   



                 Metamyelocytes     0               %               [facility] DEPARTMENT OF



                                         PATHOLOGY AND



                                         GENOMIC MEDICINE

 

   



                 Promyelocytes     0               %               [facility] DEPARTMENT OF



                                         PATHOLOGY AND



                                         GENOMIC MEDICINE

 

   



                     Platelet slide review     Chase adequate        [facility] DEPARTMENT OF



                                         PATHOLOGY AND



                                         GENOMIC MEDICINE

 

   



                     Anisocytosis        Moderate            [facility] DEPARTMENT OF



                                         PATHOLOGY AND



                                         GENOMIC MEDICINE

 

   



                     Polychromasia       Moderate            [facility] DEPARTMENT OF



                                         PATHOLOGY AND



                                         GENOMIC MEDICINE

 

   



                     Ovalocytes          Moderate            [facility] DEPARTMENT OF



                                         PATHOLOGY AND



                                         GENOMIC MEDICINE









   



                 Performing Organization     Address         City/[facility]/Zipcode     Phone Number

 

   



                     [facility] DEPARTMENT      [address] [place] [address] 



                                         PATHOLOGY AND GENOMIC   



                                         MEDICINE   





* XR Picc Chest Portable (2018  4:13 PM CST)





 



                           Narrative                 Performed At

 

 



                           Examination: XR PICC CHEST PORTABLE      [facility]



                                         Clinical history: L03.116 Cellulitis of left lower limb, long term antibiotics 





                                         Comparison: None 



                                         Impression: 



                                         1. PICC line tip is in the SVC. 



                                         2. The cardiac silhouette is mildly enlarged. Vasculature is borderline without

 



                                         maia congestion. 



                                         3. There is no confluent infiltrate or effusion. 



                                          



                                          



                                         [facility]-[id number] 









                                        Procedure Note

 

                                        



Hm Interface, Radiology Results Incoming - 2018  4:18 PM CST



Examination: XR PICC CHEST PORTABLE



Clinical history: L03.116 Cellulitis of left lower limb, long term antibiotics



Comparison: None



Impression:

 

                                        1. PICC line tip is in the SVC.

                                        2. The cardiac silhouette is mildly enlarged. Vasculature is borderline without 

maia congestion.

                                        3. There is no confluent infiltrate or effusion.

  





   





[facility]-[id number]











   



                 Performing Organization     Address         City/State/Zipcode     Phone Number

 

   



                      [facility]          [address] [place] [address] 





* PICC INSERTION (2018  3:49 PM CST)





 



                           Narrative                 Performed At

 

 



                                         [facility] 201[address]:49 PM 



                                         PICC insertion 



                                         Date/Time: 2018 3:46 PM 



                                         Performed by: RAFAELA WEBB 



                                         Authorized by: JARED NEWMAN 



                                         Consent: 



                                         Consent obtained:Verbal 



                                         Consent given by:Patient 



                                         Risks discussed: arterial puncture, incorrect placement, nerve damage, 



                                         bleeding, infection, superficial thrombus and deep vein thrombus 



                                         Alternatives discussed:Alternative treatment 



                                         Universal protocol: 



                                         Procedure explained and questions answered to patient or proxy's 



                                         satisfaction: yes 



                                         Relevant documents present and verified: yes 



                                         Test results available and properly labeled: yes 



                                         Imaging studies available: yes 



                                         Required blood products, implants, devices, and special equipment 



                                         available: yes 



                                         Site/side marked: yes 



                                         Immediately prior to procedure, a time out was called: yes 



                                         Patient identity confirmed:Verbally with patient, arm band, provided 



                                         demographic data and hospital-assigned identification number 



                                         Pre-procedure details: 



                                         Hand hygiene: Hand hygiene performed prior to insertion 



                                         Sterile barrier technique: All elements of maximal sterile technique 



                                         followed 



                                         Skin preparation:2% chlorhexidine 



                                         Skin preparation agent: Skin preparation agent completely dried prior to 



                                         procedure 



                                         Anesthesia (see MAR for exact dosages): 



                                         Anesthesia method:Local infiltration 



                                         Local anesthetic:Lidocaine 1% w/o epi 



                                         Route of administration:Subcutaneous 



                                         PICC Line Placement Details (Will create an LDA): 



                                         Patient position:Flat 



                                         Indication:Known long term IV therapy 



                                         Location:Right basilic 



                                         Device Type:Non-valved 



                                         Catheter size:5 Fr 



                                         PICC Characteristics: 



                                         Catheter Brand:BIOFLO POWER PICC 



                                         External Catheter Length (cm):0 



                                         Internal Catheter Length (cm):40 



                                         Total Catheter Length (cm):40 



                                         Catheter Lot Number:[id number] 



                                         Catheter Expiration Date:2019 



                                         Procedure Details: 



                                         Landmarks identified: yes 



                                         Ultrasound guidance: yes 



                                         Sterile ultrasound techniques: Sterile gel and sterile probe covers were 



                                         used 



                                         Number of attempts:1 



                                         Number of PICC kits used during procedure:1 



                                         Purpose of procedure:PICC Placement 



                                         Successful PICC Placement: Yes 



                                         Patency/Placement:Flushes without difficulty, flushed with 10 mL 



                                         normal saline, positive blood return, x-ray placement verified and 



                                         injection cap placed 



                                         PICC placed utlizing ultrasound-guided Modified Seldinger Technique: Yes 



                                         Dressing/Securement:Antimicrobial dressing applied and catheter 



                                         securement device 



                                         Blood Loss Amount:Less than 20 mL 



                                         Post-Procedure Details: 



                                         Post-procedure:Dressing applied 



                                         Tip placement confirmed by chest x-ray: Yes 



                                         Patient tolerance of procedure:Tolerated well, no immediate 



                                         complications 





* Vancomycin level, trough (2018  9:38 AM CST)





   



                 Component       Value           Ref Range       Performed At

 

   



                 Vancomycin, trough     18.9            10.0 - 20.0 ug/mL     [facility] DEPARTMENT OF



                           Comment:                  PATHOLOGY AND



                           Therapeutic Ranges:       GENOMIC MEDICINE



                                         Peak 30.0 -  



                                         40.0 ug/mL  



                                         Cffgnf62.0 -  



                                         20.0 ug/mL  













                                         Specimen

 





                                         Serum









   



                 Performing Organization     Address         City/[facility]     Phone Number

 

   



                     [address] [address] 



                                         PATHOLOGY AND GENOMIC   



                                         MEDICINE   





* CBC hemogram (2018  7:00 AM CST)





   



                 Component       Value           Ref Range       Performed At

 

   



                 WBC             4.82            4.50 - 11.00 k/uL     [facility] DEPARTMENT OF



                                         PATHOLOGY AND



                                         GENOMIC MEDICINE

 

   



                 RBC             3.25 (L)        4.40 - 6.00 m/uL     [facility] DEPARTMENT OF



                                         PATHOLOGY AND



                                         GENOMIC MEDICINE

 

   



                 HGB             8.7 (L)         14.0 - 18.0 g/dL     [facility] DEPARTMENT OF



                                         PATHOLOGY AND



                                         GENOMIC MEDICINE

 

   



                 HCT             27.5 (L)        41.0 - 51.0 %     [facility] DEPARTMENT OF



                                         PATHOLOGY AND



                                         GENOMIC MEDICINE

 

   



                 MCV             84.6            82.0 - 100.0 fL     [facility] DEPARTMENT OF



                                         PATHOLOGY AND



                                         GENOMIC MEDICINE

 

   



                 MCH             26.8 (L)        27.0 - 34.0 pg     [facility] DEPARTMENT OF



                                         PATHOLOGY AND



                                         GENOMIC MEDICINE

 

   



                 MCHC            31.6            31.0 - 37.0 g/dL     [facility] DEPARTMENT OF



                                         PATHOLOGY AND



                                         GENOMIC MEDICINE

 

   



                 RDW - SD        46.8            37.0 - 55.0 fL     [facility] DEPARTMENT OF



                                         PATHOLOGY AND



                                         GENOMIC MEDICINE

 

   



                 MPV             9.8             8.8 - 13.2 fL     [facility] DEPARTMENT OF



                                         PATHOLOGY AND



                                         GENOMIC MEDICINE

 

   



                 Platelet count     262             150 - 400 k/uL     [facility] DEPARTMENT OF



                                         PATHOLOGY AND



                                         GENOMIC MEDICINE

 

   



                 Nucleated RBC     0.00            /100 WBC        [facility] DEPARTMENT OF



                                         PATHOLOGY AND



                                         GENOMIC MEDICINE













                                         Specimen

 





                                         Blood









   



                 Performing Organization     Address         City/[facility]     Phone Number

 

   



                     [address] [address] 



                                         PATHOLOGY AND GENOMIC   



                                         MEDICINE   





* XR Foot 3+ Vw Left (01/15/2018  7:52 PM CST)





 



                           Narrative                 Performed At

 

 



                           EXAMINATION:XR FOOT 3VW LEFT     [facility]



                                         CLINICAL HISTORY:OSTEOMYELITISFOOT, charcot vs. osteomyelitis 



                                         COMPARISON:MRI 2018 



                                         IMPRESSION: 



                                         3 views of the left foot were acquired. 



                                         Diffuse osteopenia and there are extensive bony deformities and soft tissue 



                                         swelling is noted predominating about the hindfoot and midfoot. Status post 



                                         great toe amputation. Marked osteolysis and destructive changes are seen, most 





                                         severely about the 



                                         tarsometatarsal articulation but essentially involving all tarsal and metatarsal

 



                                         bones. There is relative sparing of the phalanges and the dorsal 



                                         calcaneous.The process is mostly lytic/destructive with little to no 



                                         sclerotic changes seen. Overall, 



                                         acute osteomyelitis is likely the predominant process. 



                                         No definite soft tissue gas is seen. 



                                         Diffuse vascular calcifications. 



                                         [facility]-[id number] 









                                        Procedure Note

 

                                        



 Interface, Radiology Results Incoming - 01/15/2018  8:21 PM CST



EXAMINATION:  XR FOOT 3  VW LEFT



CLINICAL HISTORY:  OSTEOMYELITIS  FOOT, charcot vs. osteomyelitis



COMPARISON:  MRI 2018



IMPRESSION:



                                        3 views of the left foot were acquired.



Diffuse osteopenia and there are extensive bony deformities and soft tissue 
swelling is noted predominating about the hindfoot and midfoot. Status post 
great toe amputation. Marked osteolysis and destructive changes are seen, most 
severely about the 

tarsometatarsal articulation but essentially involving all tarsal and metatarsal
bones. There is relative sparing of the phalanges and the dorsal calcaneous.  
The process is mostly lytic/destructive with little to no sclerotic changes 
seen. Overall, 

acute osteomyelitis is likely the predominant process.



No definite soft tissue gas is seen.



Diffuse vascular calcifications.











[facility]-[id number]











   



                 Performing Organization     Address         City/[facility]/Zipcode     Phone Number

 

   



                     [facility]          [address] [place] [address] 





* Fungus smear (01/15/2018  6:37 PM CST)





   



                 Component       Value           Ref Range       Performed At

 

   



                     Fungus smear        No fungi observed.      [facility] DEPARTMENT OF



                           Comment:                  PATHOLOGY AND



                           Specimen Information      GENOMIC MEDICINE



                                         Specimen Source: Wound  



                                         Specimen Site: Foot  













                                         Specimen

 





                                         Wound - Foot









   



                 Performing Organization     Address         City/[facility]/Zipcode     Phone Number

 

   



                     [facility] DEPARTMENT OF     [address] [place] [address] 



                                         PATHOLOGY AND GENOMIC   



                                         MEDICINE   





* AFB culture (01/15/2018  6:37 PM CST)





   



                 Component       Value           Ref Range       Performed At

 

   



                     AFB culture isolate     No growth after 6 weeks of      [facility] DEPARTMENT OF



                           incubation.               PATHOLOGY AND



                           Comment:                  GENOMIC MEDICINE



                                         Specimen Information  



                                         Specimen Source: Wound  



                                         Specimen Site: Foot  













                                         Specimen

 





                                         Wound - Foot









   



                 Performing Organization     Address         City/[facility]/Zipcode     Phone Number

 

   



                     [facility] DEPARTMENT OF     [address] [place] [id number] 



                                         PATHOLOGY AND GENOMIC   



                                         MEDICINE   





* Aerobic culture (01/15/2018  6:37 PM CST)



Only the most recent of 2 results within the time period is included.





   



                 Component       Value           Ref Range       Performed At

 

   



                     Aerobic culture isolate     Staphylococcus, coagulase      [facility] DEPARTMENT OF



                           negative                  PATHOLOGY AND



                           Recovered in Broth only:      GENOMIC MEDICINE



                                         susceptibility to follow  



                                         (A)  



                                         Comment:  



                                         Specimen Information  



                                         Specimen Source: Wound  



                                         Specimen Site: Foot  













                                         Specimen

 





                                         Wound - Foot









                    Antibiotic          Method              Susceptibility



                                         Organism   

 

                    Ampicillin          NATALIE                 



 mcg/mL: Resistant



                                         Staphylococcus coagulase   



                                         negative   

 

                    Clindamycin         NATALIE                 



>2 mcg/mL: Resistant



                                         Staphylococcus coagulase   



                                         negative   

 

                    Erythromycin        NATALIE                 



>4 mcg/mL: Resistant



                                         Staphylococcus coagulase   



                                         negative   

 

                    Levofloxacin        NATALIE                 



>4 mcg/mL: Resistant



                                         Staphylococcus coagulase   



                                         negative   

 

                    Linezolid           NATALIE                 



2 mcg/mL: Susceptible



                                         Staphylococcus coagulase   



                                         negative   

 

                    Minocycline         NATALIE                 



2 mcg/mL: Susceptible



                                         Staphylococcus coagulase   



                                         negative   

 

                    Oxacillin           NATALIE                 



>1 mcg/mL: Resistant



                                         Staphylococcus coagulase   



                                         negative   

 

                    Penicillin G        NATALIE                 



>1 mcg/mL: Resistant



                                         Staphylococcus coagulase   



                                         negative   

 

                    Rifampin            NATALIE                 



<=0.5 mcg/mL: Susceptible



                                         Staphylococcus coagulase   



                                         negative   

 

                    Tetracycline        NATALIE                 



2 mcg/mL: Susceptible



                                         Staphylococcus coagulase   



                                         negative   

 

                    Vancomycin          NATALIE                 



2 mcg/mL: Susceptible



                                         Staphylococcus coagulase   



                                         negative   

 

                    Trimethoprim/Sulfamethoxazole    NATALIE                 



<=0.5/9.5 mcg/mL: Susceptible



                                         Staphylococcus coagulase   



                                         negative   









   



                 Performing Organization     Address         City/[facility]     Phone Number

 

   



                     [facility] DEPARTMENT OF     [address] [place], [place] [id number] 



                                         PATHOLOGY AND GENOMIC   



                                         MEDICINE   





* Gram stain (01/15/2018  6:37 PM CST)



Only the most recent of 2 results within the time period is included.





   



                 Component       Value           Ref Range       Performed At

 

   



                     Gram stain isolate     Rare WBC's          [facility] DEPARTMENT OF



                           No organisms seen         PATHOLOGY AND



                           Comment:                  GENOMIC MEDICINE



                                         Specimen Information  



                                         Specimen Source: Wound  



                                         Specimen Site: Foot  













                                         Specimen

 





                                         Wound - Foot









   



                 Performing Organization     Address         City/State/Zipcode     Phone Number

 

   



                     [facility] DEPARTMENT OF     [address] [place] [id number] 



                                         PATHOLOGY AND GENOMIC   



                                         MEDICINE   





* AFB stain (01/15/2018  6:37 PM CST)





   



                 Component       Value           Ref Range       Performed At

 

   



                     AFB stain           No acid fast bacilli (AFB)      [facility] DEPARTMENT OF



                           seen.                     PATHOLOGY AND



                           Comment:                  GENOMIC MEDICINE



                                         Specimen Information  



                                         Specimen Source: Wound  



                                         Specimen Site: Foot  













                                         Specimen

 





                                         Wound - Foot









   



                 Performing Organization     Address         City/[facility]/Zipcode     Phone Number

 

   



                     [facility] DEPARTMENT OF     [address] [place] [address] 



                                         PATHOLOGY AND GENOMIC   



                                         MEDICINE   





* Fungus culture (01/15/2018  6:37 PM CST)





   



                 Component       Value           Ref Range       Performed At

 

   



                     Fungus culture isolate     No growth after 4 weeks of      [facility] DEPARTMENT OF



                           incubation.               PATHOLOGY AND



                           Comment:                  GENOMIC MEDICINE



                                         Specimen Information  



                                         Specimen Source: Wound  



                                         Specimen Site: Foot  













                                         Specimen

 





                                         Wound - Foot









   



                 Performing Organization     Address         City/[facility]/Zipcode     Phone Number

 

   



                     [facility] DEPARTMENT OF     [address] [place] [address] 



                                         PATHOLOGY AND GENOMIC   



                                         MEDICINE   





* Anaerobic culture (01/15/2018  6:37 PM CST)



Only the most recent of 2 results within the time period is included.





   



                 Component       Value           Ref Range       Performed At

 

   



                     Anaerobic culture isolate     No anaerobic organisms      [facility] DEPARTMENT OF



                           isolated.                 PATHOLOGY AND



                           Comment:                  GENOMIC MEDICINE



                                         Specimen Information  



                                         Specimen Source: Wound  



                                         Specimen Site: Foot  













                                         Specimen

 





                                         Wound - Foot









   



                 Performing Organization     Address         City/[facility]/Zipcode     Phone Number

 

   



                     [facility] DEPARTMENT OF     [address] [place] [address] 



                                         PATHOLOGY AND GENOMIC   



                                         MEDICINE   





* MRI Lower Extremity W Wo Contrast Left (2018  6:35 PM CST)





 



                           Narrative                 Performed At

 

 



                           This result has an attachment that is not available.      [facility]



                                         EXAMINATION:MRI LOWER EXTREMITY W WO CONTRAST LEFT 



                                         CLINICAL HISTORY:r o osteomyelitis 



                                         COMPARISON:None. 



                                         FINDINGS: 



                                         There is extensive fatty infiltration of the marrow spaces involving the 



                                         hindfoot and the midfoot. 



                                         The findings are consistent with extensive osteomyelitis involving the tarsal 



                                         bones as well as the anterior half of the calcaneus as well as the second and 



                                         third metatarsals. The first metatarsal has been resected. 



                                         There is a large abscess extending from the medial tarsal bones into the medial

 



                                         soft tissues, measuring approximately 5 cm in a coronal dimension and extending

 



                                         over 7 cm in an AP dimension, and extending over 3 cm in height and extending to

 



                                         the medial 



                                         skin surface. In addition, a small abscess is noted between the third and fourth

 



                                         metatarsal phalangeal joints. 



                                         IMPRESSION: 



                                         Extensive osteomyelitis involving the hindfoot, midfoot, and metatarsals. 



                                         Abscesses as noted above. 



                                         [facility]-[id number] 









                                        Procedure Note

 

                                        



 Interface, Radiology Results Incoming - 2018  7:11 PM CST



EXAMINATION:  MRI LOWER EXTREMITY W WO CONTRAST LEFT



CLINICAL HISTORY:  r o osteomyelitis



COMPARISON:  None.





FINDINGS:



There is extensive fatty infiltration of the marrow spaces involving the 
hindfoot and the midfoot.



The findings are consistent with extensive osteomyelitis involving the tarsal 
bones as well as the anterior half of the calcaneus as well as the second and 
third metatarsals. The first metatarsal has been resected.



There is a large abscess extending from the medial tarsal bones into the medial 
soft tissues, measuring approximately 5 cm in a coronal dimension and extending 
over 7 cm in an AP dimension, and extending over 3 cm in height and extending to
the medial 

skin surface. In addition, a small abscess is noted between the third and fourth
metatarsal phalangeal joints.





IMPRESSION:



Extensive osteomyelitis involving the hindfoot, midfoot, and metatarsals. 
Abscesses as noted above.







[facility]-[id number]











   



                 Performing Organization     Address         City/[facility]/Zipcode     Phone Number

 

   



                      [facility]          [address] [place] [address] 





* Lactic acid level, SEPSIS - Now and repeat 2x every 3 hours (2018  5:10 
  AM CST)



Only the most recent of 3 results within the time period is included.





   



                 Component       Value           Ref Range       Performed At

 

   



                 Lactic acid     1.9             0.5 - 2.2 mmol/L     [facility] DEPARTMENT OF



                                         PATHOLOGY AND



                                         GENOMIC MEDICINE













                                         Specimen

 





                                         Blood









   



                 Performing Organization     Address         City/[facility]     Phone Number

 

   



                     [facility] DEPARTMENT OF     [address] [place] [address] 



                                         PATHOLOGY AND GENOMIC   



                                         MEDICINE   





* PV Duplex Arterial Lower Extremity (2018 10:45 PM CST)





 



                           Narrative                 Performed At

 

 



                                [facility]





                                         Vascular Ultrasound Laboratory 



                                         Lower Extremity Arterial Duplex Report 



                                          [address] [place] [address].Name:Deedee MOSQUEDA.ID:[id number] 





                                         .Date: 2018 Refer.MD:DENIS MOLINA MD 



                                         Exam Time: 11:08:00 PM Study Type:LE 



                                         Arterial 



                                         DOBAge:1950,67Y Sex: 



                                         MALE 



                                         Sonogrphr: Hubert Moore. 



                                         Stat.:Inpatient 



                                         Room:[place] [address] TapeVol: 



                                         RF, 



                                         CPT - 4: 57252 Echo Event 



                                         ID:[id number] 



                                         Order ID:[id number] 



                                         Reason for Study:Non-healing wound of the left lateral foot with 



                                         associated redness, swelling and pain. History of PAD s/p left toe 



                                         amputation and right 2nd toe amputation, CVA (2017 -per patient), DM, 



                                         HTN. 



                                         ------------------------------------ 



                                         SUMMARY: 



                                         ------------------------------------ 



                                         DUPLEX SCAN OBSERVATIONS: 



                                         LEFT: The common femoral and profunda femoris artery is visualized 



                                         with hyperechoic arterial walls, colorflow and biphasic arterial 



                                         Doppler signals present. The femoral artery is calcified throughout 



                                         its length with colorflow and biphasic arterial Doppler signals 



                                         present. The popliteal artery is visualized with hyoerechoic arterial 



                                         walls and hard plaque distally. Colorflow disturbance and elevated 



                                         velocity is noted at the distal popliteal artery. The posterior tibial 



                                         artery is visualized with hyoerechoic arterial walls, scattered 



                                         calcified plaque, colorflow and monophasic arterial Doppler signals. 



                                         Multiple collaterals are noted at the distal posterior tibial artery. 



                                         The anterior tibial artery is calcified with monophasic arterial 



                                         Doppler signals noted distally. The peroneal artery could not be 



                                         visualized. 



                                         ANKLE/BRACHIAL INDEX: 



                                          RIGHTLEFT 



                                          Brachial Artery Pressure 160 [id number] mmHg 



                                          DP>255 mmHg>255mmHg 



                                          PT>255 tuUm161 mmHg 



                                          MALDONADO [id number]-wzamgnlugqloGjo-ntyjjgecfzeg 



                                          MALDON[place]O PTNon-compressible 0.84 



                                         TOE/BRACHIAL INDEX: 



                                          Great Gif813 mmHg78 mmHg 



                                          TBI0.57 0.48 



                                          



                                         PRELIMINARY FINDINGS: 



                                         1. Patency of the left common femoral artery, profunda femoris, 



                                         femoral artery and anterior tibial artery. 



                                         2. 50%-75% stenosis of the left distal popliteal artery. 



                                         3. Monophasic arterial Doppler signals of the left posterior tibial 



                                         artery with collaterals noted. 



                                         4. Unable to calculate the right ankle/brachial index due to 



                                         non-compressible arteries. 



                                         5. Unable to calculate the left dorsalis pedis ankle/brachial index 



                                         due to non-compressible arteries. 



                                         6. The right toe/brachial index falls into the mild category and the 



                                         left toe/brachial index (using the 2nd digit) falls into the mild 



                                         category. 



                                         PHYSICIAN INTERPRETATION: 



                                         Arterial duplex examination ofleft lower extremity demonstrates 



                                         50%-75% stenosis of the left distal popliteal artery. 



                                         Calcifications. The right toe/brachial index falls into the mild 



                                         category and the left toe/brachial index (using the 2nd digit) falls 



                                         into the mild category. 



                                         ------------------------------------ 



                                         MEASUREMENTS: 



                                         ------------------------------------ 



                                         DOPPLER 



                                         Left CFA Dist 



                                         CFA Dist  cm/s 



                                         Left Profunda 



                                         Profunda  cm/s 



                                         Left SFA Prox 



                                         SFA Prox  cm/s 



                                         Left SFA Mid 



                                         SFA Mid [id number] cm/s 



                                         Left SFA Dist 



                                         SFA Dist  cm/s 



                                         Left Pop Prox 



                                         Pop Prox  cm/s 



                                         Left Pop Mid 



                                         Pop Mid PSV 93.6 cm/s 



                                         Left Pop Dist 



                                         Pop Dist  cm/s 



                                         Left Pop Dist 1 



                                         Pop Dist 1  cm/s 



                                         Left PTA Prox 



                                         PTA Prox PSV69.9 cm/s 



                                         Left PTA Mid 



                                         PTA Mid PSV 82.8 cm/s 



                                         Left PTA Distal 



                                         PTA Distal PSV49.5 cm/s 



                                         Left REAGAN Prox 



                                         REAGAN Prox  cm/s 



                                         Left REAGAN Mid 



                                         REAGAN Mid ETZ327 cm/s 



                                         Left REAGAN Distal 



                                         REAGAN Distal PSV73.4 cm/s 



                                         Signed 2018 08:54 AM 



                                         Sonia Lewis MD, RPVI 









                                        Procedure Note

 

                                        



Interface, Radiology Results In - 2018  8:54 AM [facility]



                                   

                    Vascular Ultrasound Laboratory

                Lower Extremity Arterial Duplex Report

           [address] [place] [address]

Pat.Name:  FABIÁN MOSQUEDA          Pat.ID:    [id number]               

.Date:   2018               Refer.MD:  DENIS MOLINA MD        

Exam Time: 11:08:00 PM             Study Type:LE Arterial             

  Age:  1950,67Y           Sex:       MALE                    

Sonogrphr: La Nena Escamilla RVT  Pat. Stat.:Inpatient               

Room:      [place] [address]                 Tape  Vol: ,                     

CPT - 4:   38756                   Echo Event ID:[id number]            

Order ID:  [id number]              

Reason for Study:Non-healing wound of the left lateral foot with

associated redness, swelling and pain. History of PAD s/p left toe

amputation and right 2nd toe amputation, CVA (2017 -per patient), DM,

HTN.

                                        ------------------------------------

SUMMARY:

                                        ------------------------------------

DUPLEX SCAN OBSERVATIONS:

 LEFT: The common femoral and profunda femoris artery is visualized

with hyperechoic arterial walls, colorflow and biphasic arterial

Doppler signals present. The femoral artery is calcified throughout

its length with colorflow and biphasic arterial Doppler signals

present. The popliteal artery is visualized with hyoerechoic arterial

walls and hard plaque distally. Colorflow disturbance and elevated

velocity is noted at the distal popliteal artery. The posterior tibial

artery is visualized with hyoerechoic arterial walls, scattered

calcified plaque, colorflow and monophasic arterial Doppler signals.

Multiple collaterals are noted at the distal posterior tibial artery.

The anterior tibial artery is calcified with monophasic arterial

Doppler signals noted distally. The peroneal artery could not be

visualized. 

 

 ANKLE/BRACHIAL INDEX:

     RIGHT  LEFT

   Brachial Artery Pressure 160 mmHg  174 mmHg

   DP  >255 mmHg  >255mmHg

   PT  >255 mmHg  146 mmHg

 

   MALDON[place]O DP  Non-compressible  Non-compressible

   M[place]DON[place]O PT  Non-compressible 0.84            

 

 TOE/BRACHIAL INDEX:

   Great Toe  100 mmHg  78 mmHg

   TBI  0.57   0.48

   

 

 PRELIMINARY FINDINGS:

 1. Patency of the left common femoral artery, profunda femoris,

femoral artery and anterior tibial artery. 

 2. 50%-75% stenosis of the left distal popliteal artery. 

 3. Monophasic arterial Doppler signals of the left posterior tibial

artery with collaterals noted. 

 4. Unable to calculate the right ankle/brachial index due to

non-compressible arteries. 

 5. Unable to calculate the left dorsalis pedis ankle/brachial index

due to non-compressible arteries. 

 6. The right toe/brachial index falls into the mild category and the

left toe/brachial index (using the 2nd digit) falls into the mild

category. 

 

 PHYSICIAN INTERPRETATION:

 Arterial duplex examination of  left lower extremity demonstrates 

                                        50%-75% stenosis of the left distal popliteal artery. 

  Calcifications. The right toe/brachial index falls into the mild

category and the left toe/brachial index (using the 2nd digit) falls

into the mild category. 

 

 

                                        ------------------------------------

MEASUREMENTS:

                                        ------------------------------------

                                DOPPLER

Left CFA Dist  

 CFA Dist PSV     139 cm/s         

Left Profunda  

 Profunda PSV     103 cm/s         

Left SFA Prox  

 SFA Prox PSV     126 cm/s         

Left SFA Mid  

 SFA Mid PSV      149 cm/s         

Left SFA Dist  

 SFA Dist PSV     152 cm/s         

Left Pop Prox  

 Pop Prox PSV     152 cm/s         

Left Pop Mid  

 Pop Mid PSV     93.6 cm/s         

Left Pop Dist  

 Pop Dist PSV     119 cm/s         

Left Pop Dist 1  

 Pop Dist 1 PSV   314 cm/s         

Left PTA Prox  

 PTA Prox PSV    69.9 cm/s         

Left PTA Mid  

 PTA Mid PSV     82.8 cm/s         

Left PTA Distal  

 PTA Distal PSV  49.5 cm/s         

Left REAGAN Prox  

 REAGAN Prox PSV     100 cm/s         

Left REAGAN Mid  

 REAGAN Mid PSV      113 cm/s         

Left REAGAN Distal  

 REAGAN Distal PSV  73.4 cm/s          

 

Signed 2018 08:54 AM

Sonia Lewis MD, RPVI









   



                 Performing Organization     Address         City/[facility]/Zipcode     Phone Number

 

   



                     [facility]            [address] [place] [address] 





* Troponin (2018  5:33 PM CST)





   



                 Component       Value           Ref Range       Performed At

 

   



                 Troponin        <0.30           0.00 - 0.30 ng/mL     [facility] DEPARTMENT OF



                           Comment:                  PATHOLOGY AND



                           0.30 - 1.49               GENOMIC MEDICINE



                                         ng/mlMay  



                                         indicate increased risk of  



                                         acute  



                                           



                                          coronary  



                                         syndrome.  



                                           



                                           



                                         >=1.5  



                                         ng/ml  



                                         Consistent with acute  



                                         myocardial  



                                           



                                          infarction.  



                                           



                                           



                                           



                                           



                                           



                                         The diagnostic value of a  



                                         single normal or  



                                         non-diagnostic  



                                         result is  



                                         questionable.Serial  



                                         samples at 2-6 hour intervals  



                                         are required to rule out acute  



                                         myocardial injury.  













                                         Specimen

 





                                         Plasma specimen









   



                 Performing Organization     Address         City[facility]     Phone Number

 

   



                     [facility] DEPARTMENT OF     [address] [place] [address] 



                                         PATHOLOGY AND GENOMIC   



                                         MEDICINE   





* B natriuretic peptide (2018  5:33 PM CST)





   



                 Component       Value           Ref Range       Performed At

 

   



                 BNP             264 (H)         0 - 100 pg/mL     [facility] DEPARTMENT OF



                                         PATHOLOGY AND



                                         GENOMIC MEDICINE













                                         Specimen

 





                                         Blood









   



                 Performing Organization     Address         City/[facility]/Zipcode     Phone Number

 

   



                     [facility] DEPARTMENT OF     [address] [place] [id number] 



                                         PATHOLOGY AND GENOMIC   



                                         MEDICINE   





after 2017



Insurance







     



            Payer      Benefit     Subscriber ID     Type       Phone      Address



                                         Plan /    



                                         Group    

 

     



              MEDICARE     MEDICARE     xxxxxxxxxx     Medicare      [place], [place]



                                         PART A AND    



                                         B    

 

     



                 MEDICAID        MEDICAID        xxxxxxxxx       Medicaid  









     



            Guarantor Name     Account     Relation to     Date of     Phone      Billing Address



                     Type                Patient             Birth  

 

     



            Fabián Mosqueda     Personal/F     Self       1950     [phone]     [address]               ([place])              [place] [address]







Advance Directives





Patient has advance care planning documents on file. For more information, tammie villalobos contact:



Alfredo Salmeron



[address] [place] [address]

## 2018-11-27 NOTE — DIAGNOSTIC IMAGING REPORT
EXAMINATION:  CHEST SINGLE (PORTABLE)    



INDICATION:      

^ERMD ORDER

^[id number]

^1932

^Y



COMPARISON:  None

     

FINDINGS:  AP view   



TUBES and LINES:  None.



LUNGS:  Lungs are well inflated.  Right lower lung field hazy opacification.   





PLEURA:  No significant pleural effusion or pneumothorax.



HEART AND MEDIASTINUM:  The cardiac silhouette is enlarged.    



BONES AND SOFT TISSUES:  No acute osseous lesion.  Soft tissues are

unremarkable.



UPPER ABDOMEN: No free air under the diaphragm.    



IMPRESSION: 

Right lower lung field hazy opacification, representing subsegmental

atelectasis or developing pneumonia in the appropriate clinical setting.





Signed by: Dr. Daniele Givens MD on 11/27/2018 8:13 PM

## 2018-11-27 NOTE — XMS REPORT
Clinical Summary

                             Created on: 2018



Fabián Mosqueda

External Reference #: [id number]

: 1950

Sex: Male



Demographics







                          Address                   [address] [place]  [address]

 

                          Home Phone                [phone]

 

                          Preferred Language        Unknown

 

                          Marital Status            

 

                          Jehovah's witness Affiliation     [id number]

 

                          Race                      White

 

                          Ethnic Group              /Latin





Author







                          Author                    [facility]

 

                          Organization              [facility]

 

                          Address                   Unknown

 

                          Phone                     Unavailable







Support







                Name            Relationship    Address         Phone

 

                Fabián Mosqueda    [employer]            Unknown         [phone]







Care Team Providers







                    Care Team Member Name    Role                Phone

 

                    Sebastien Allison MD    PCP                 [phone]







Allergies

No Known Allergies



Medications







                          End Date                  Status



              Medication     Sig          Dispensed     Refills      Start  



                                         Date  

 

                                                    Active



                     pantoprazole (PROTONIX)     Take 40 mg by       0   



                           40 MG EC tablet           mouth daily     



                                         as needed.     

 

                                                    Active



                     pravastatin (PRAVACHOL)     Take 20 mg by       0   



                           20 MG tablet              mouth     



                                         nightly.     

 

                          2018                Discontinued



                     insulin NPH and regular     Inject 50           0   



                           human (HumuLIN 70/30) 100     Units under     



                           unit/mL (70-30) injection     the skin     



                                         every     



                                         morning.     

 

                          2018                Discontinued



                     insulin NPH and regular     Inject 20           0   



                           human (HumuLIN 70/30) 100     Units under     



                           unit/mL (70-30) injection     the skin     



                                         every     



                                         evening.     

 

                          2018                Discontinued



                     metoprolol succinate XL     Take 25 mg by       0   



                           (TOPROL-XL) 25 mg 24 hr     mouth daily.     



                                         tablet      

 

                          2018                Discontinued



                     acetaminophen (TYLENOL)     Take 500 mg         0   



                           500 MG tablet             by mouth once     



                                         as needed for     



                                         mild pain.     

 

                          2018                Discontinued



                     diphenhydrAMINE     Take 25 mg by       0   



                           (BENADRYL) 25 mg tablet     mouth every 8     



                                         (eight)     



                                         hours.     

 

                          2018                Discontinued



                 XARELTO 15 mg tablet     Take 1 tablet      0                 



                           by mouth                  7  



                                         daily.     

 

                          2018                



              insulin 70/30 NPH and     Inject 20     10 mL        12             



                     regular human (HumuLIN     Units under         8  



                           70/30) 100 unit/mL        the skin     



                           (70-30) injection         every morning     



                                         for 30 days.     

 

                          2018                



              insulin 70/30 NPH and     Inject 20     10 mL        12             



                     regular human (HumuLIN     Units under         8  



                           70/30) 100 unit/mL        the skin     



                           (70-30) injection         every evening     



                                         for 30 days.     

 

                          2018                



              rivaroxaban (XARELTO) 20     Take 1 tablet     30 tablet     0              



                     mg tablet           (20 mg total)       8  



                                         by mouth     



                                         daily for 30     



                                         days.     

 

                          2018                



                 cefepime (MAXIPIME) IVPB     Infuse 2 g      0                 



                     2 gram ADD-[place] in 50     into a venous       8  



                           mL                        catheter     



                                         every 12     



                                         (twelve)     



                                         hours for 33     



                                         days.     

 

                          2018                



                 metroNIDAZOLE (FLAGYL)     Take 1 tablet      0                 



                     500 MG tablet       (500 mg             8  



                                         total) by     



                                         mouth 3     



                                         (three) times     



                                         a day for 34     



                                         days.     

 

                          2018                



                 povidone-iodine     Apply           0                 



                     (BETADINE) 10 % external     topically           8  



                           solution                  daily for 30     



                                         days.     

 

                          2018                



              vancomycin 1,000 mg in     Infuse 1,000     1 each       0              



                     sodium chloride 0.9 % 250     mg into a           8  



                           mL IVPB                   venous     



                                         catheter     



                                         every 12     



                                         (twelve)     



                                         hours for 34     



                                         days.     

 

                          10/13/2018                



              hydrALAZINE (APRESOLINE)     Take 1 tablet     60 tablet     0              



                     50 MG tablet        (50 mg total)       8  



                                         by mouth     



                                         every 12     



                                         (twelve)     



                                         hours for 30     



                                         days.     

 

                          10/14/2018                



              metoprolol succinate XL     Take 1 tablet     30 tablet     0              



                     (TOPROL-XL) 100 mg 24 hr     (100 mg             8  



                           tablet                    total) by     



                                         mouth daily     



                                         for 30 days.     

 

                          10/13/2018                



              NIFEdipine XL (PROCARDIA     Take 1 tablet     60 tablet     0              



                     XL) 60 MG 24 hr tablet     (60 mg total)       8  



                                         by mouth 2     



                                         (two) times a     



                                         day for 30     



                                         days.     

 

                          10/13/2018                



              rivaroxaban (XARELTO) 15     Take 1 tablet     30 tablet     0              



                     mg tablet           (15 mg total)       8  



                                         by mouth     



                                         daily for 30     



                                         days.     

 

                          10/13/2018                



              ramelteon (ROZEREM) 8 mg     Take 1 tablet     30 tablet     0              



                     tablet              (8 mg total)        8  



                                         by mouth     



                                         nightly for     



                                         30 days.     

 

                          10/13/2018                



              insulin GLARGINE (LANTUS)     Inject 25     7.5 mL       0              



                     100 unit/mL injection     Units under         8  



                           (vial)                    the skin     



                                         nightly for     



                                         30 days.     

 

                          10/13/2018                



              insulin lispro (HumaLOG)     Inject 10     10 mL        12             



                     100 unit/mL injection     Units under         8  



                                         the skin 3     



                                         (three) times     



                                         a day with     



                                         meals for 30     



                                         days.     







Active Problems







 



                           Problem                   Noted Date

 

 



                           Uncontrolled type 2 diabetes mellitus with nephropathy     2018

 

 



                           Mixed hyperlipidemia      2018

 

 



                           Syncope and collapse      2018

 

 



                           Hypokalemia               2018

 

 



                           Cellulitis of left lower extremity     2018

 

 



                           Urinary tract infection in elderly patient     10/31/2017

 

 



                           Complicated UTI (urinary tract infection)     10/31/2017

 

 



                           Cerebrovascular accident (CVA)     2017







Encounters







                          Care Team                 Description



                     Date                Type                Specialty  

 

                                        



Emma Alba MD Patel, Amitkumar Natvarlal, MD          Cerebrovascular accident (CVA) due to embolism of

 middle cerebral artery, unspecified blood vessel laterality (Primary Dx)



                     2018          [facility]  



                           -                         Encounter   



                                         2018    

 

                                                    N/A



                     2018          Intake              Access  

 

                                        



Denis Molina MD Tang, Daming, MD Joglekar, Swati, MD                     Other acute osteomyelitis of left ankle (Primary Dx); 

Cellulitis of left lower extremity; 

Dehydration; 

Hypokalemia; 

Cellulitis of left leg; 

Chronic cerebrovascular accident (CVA); 

Controlled type 2 diabetes mellitus with other skin complication, without long-
term current use of insulin; 

Essential hypertension; 

Bradycardia; 

Abscess of left leg



                     2018          [facility]  



                           -                         Encounter   



                                         2018    

 

                                        



[facility]                       



                     2018          Orders Only         Procedural Cardiology  



after 2017



Immunizations







  



                     Name                Dates Previously Given     Next Due

 

  



                           FLUCELVAX QUAD PF (0.5mL     2018 



                                         syringe)  







Social History







                                        Date



                 Tobacco Use     Types           Packs/Day       Years Used 

 

                                         



                                         Never Smoker    

 

    



                                         Smokeless Tobacco: Never   



                                         Used   









   



                 Alcohol Use     Drinks/Week     oz/Week         Comments

 

   



                                         No   









 



                           Sex Assigned at Birth     Date Recorded

 

 



                                         Not on file 









                                        Industry



                           Job Start Date            Occupation 

 

                                        Not on file



                           Not on file               Not on file 









                                        Travel End



                           Travel History            Travel Start 

 





                                         No recent travel history available.







Last Filed Vital Signs







                                        Time Taken



                           Vital Sign                Reading 

 

                                        2018  8:22 AM CDT



                           Blood Pressure            140/69 

 

                                        2018  8:22 AM CDT



                           Pulse                     61 

 

                                        2018  8:22 AM CDT



                           Temperature               36.3 C (97.4 F) 

 

                                        2018  8:22 AM CDT



                           Respiratory Rate          19 

 

                                        2018  8:22 AM CDT



                           Oxygen Saturation         97% 

 

                                        -



                           Inhaled Oxygen            - 



                                         Concentration  

 

                                        2018  5:00 PM CDT



                           Weight                    87.1 kg (192 lb) 

 

                                        2018  5:00 PM CDT



                           Height                    182.9 cm (6') 

 

                                        2018  5:00 PM CDT



                           Body Mass Index           26.04 







Plan of Treatment







   



                 Health Maintenance     Due Date        Last Done       Comments

 

   



                           DIABETIC RETINAL EYE EXAM     1950  

 

   



                           URINE MICROALBUMIN        1958  

 

   



                           COLON CANCER SCREENING     1998  

 

   



                           SHINGRIX VACCINE (1 of 2)     1998  

 

   



                           ZOSTER VACCINE            2008  

 

   



                           PNEUMOCOCCAL              2013  



                                         POLYSACCHARIDE VACCINE   



                                         AGE 65 AND OVER   

 

   



                           PNEUMOCOCCAL-13           2013  

 

   



                     DIABETIC FOOT EXAM     2019, 2018 

 

   



                     INFLUENZA VACCINE     Completed           2018 







Procedures







                                        Comments



                 Procedure Name     Priority        Date/Time       Associated Diagnosis 

 

                                        



Results for this procedure are in the results section.



                     POC GLUCOSE         Routine             2018  



                                         12:52 PM CDT  

 

                                        



Results for this procedure are in the results section.



                     POC GLUCOSE         Routine             2018  



                                         9:01 AM CDT  

 

                                        



Results for this procedure are in the results section.



                     POC GLUCOSE         Routine             2018  



                                         9:07 PM CDT  

 

                                        



Results for this procedure are in the results section.



                     POC GLUCOSE         Routine             2018  



                                         5:02 PM CDT  

 

                                        



Results for this procedure are in the results section.



                     POC GLUCOSE         Routine             2018  



                                         12:07 PM CDT  

 

                                        



Results for this procedure are in the results section.



                     POC GLUCOSE         Routine             2018  



                                         7:48 AM CDT  

 

                                        



Results for this procedure are in the results section.



                     POC GLUCOSE         Routine             2018  



                                         9:33 PM CDT  

 

                                        



Results for this procedure are in the results section.



                     POC GLUCOSE         Routine             2018  



                                         5:19 PM CDT  

 

                                        



Results for this procedure are in the results section.



                     FL MODIFIED BARIUM     Routine             2018  



                           SWALLOW                   2:32 PM CDT  

 

                                        



Results for this procedure are in the results section.



                     POC GLUCOSE         Routine             2018  



                                         11:32 AM CDT  

 

                                        



Results for this procedure are in the results section.



                     POC GLUCOSE         Routine             2018  



                                         8:51 AM CDT  

 

                                        



Results for this procedure are in the results section.



                     POC GLUCOSE         Routine             2018  



                                         5:03 AM CDT  

 

                                        



Results for this procedure are in the results section.



                     ZZESTIMATED GFR     Routine             2018  



                                         4:45 AM CDT  

 

                                        



Results for this procedure are in the results section.



                     PARTIAL THROMBOPLASTIN     Routine             2018  



                           TIME (PTT)                4:45 AM CDT  

 

                                        



Results for this procedure are in the results section.



                     MAGNESIUM LEVEL     Routine             2018  



                                         4:45 AM CDT  

 

                                        



Results for this procedure are in the results section.



                     HC COMPLETE BLD COUNT     Routine             2018  



                           W/AUTO DIFF               4:45 AM CDT  

 

                                        



Results for this procedure are in the results section.



                     BASIC METABOLIC PANEL     Routine             2018  



                                         4:45 AM CDT  

 

                                        



Results for this procedure are in the results section.



                     POC GLUCOSE         Routine             09/10/2018  



                                         11:14 PM CDT  

 

                                        



Results for this procedure are in the results section.



                     POC GLUCOSE         Routine             09/10/2018  



                                         7:45 PM CDT  

 

                                        



Results for this procedure are in the results section.



                     PARTIAL THROMBOPLASTIN     Routine             09/10/2018  



                           TIME (PTT)                3:00 PM CDT  

 

                                        



Results for this procedure are in the results section.



                     POC GLUCOSE         Routine             09/10/2018  



                                         12:41 PM CDT  

 

                                        



Results for this procedure are in the results section.



                     EEG AWAKE/ASLEEP LESS     Routine             09/10/2018  



                           THAN 41 MIN               10:35 AM CDT  

 

                                        



Results for this procedure are in the results section.



                     PARTIAL THROMBOPLASTIN     Routine             09/10/2018  



                           TIME (PTT)                9:00 AM CDT  

 

                                        



Results for this procedure are in the results section.



                     POC GLUCOSE         Routine             09/10/2018  



                                         8:30 AM CDT  

 

                                        



Results for this procedure are in the results section.



                     MRA NECK WO CONTRAST     Routine             09/10/2018  



                                         7:38 AM CDT  

 

                                        



Results for this procedure are in the results section.



                     MRA HEAD WO CONTRAST     Routine             09/10/2018  



                                         7:22 AM CDT  

 

                                        



Results for this procedure are in the results section.



                     MRI BRAIN WO CONTRAST     Routine             09/10/2018  



                                         7:10 AM CDT  

 

                                        



Results for this procedure are in the results section.



                     ZZESTIMATED GFR     Routine             09/10/2018  



                                         1:24 AM CDT  

 

                                        



Results for this procedure are in the results section.



                     MAGNESIUM LEVEL     Routine             09/10/2018  



                                         1:24 AM CDT  

 

                                        



Results for this procedure are in the results section.



                     BASIC METABOLIC PANEL     Routine             09/10/2018  



                                         1:24 AM CDT  

 

                                        



Results for this procedure are in the results section.



                     PARTIAL THROMBOPLASTIN     Timed               09/10/2018  



                           TIME (PTT)                12:40 AM CDT  

 

                                        



Results for this procedure are in the results section.



                     HC COMPLETE BLD COUNT     Routine             09/10/2018  



                           W/AUTO DIFF               12:40 AM CDT  

 

                                        



Results for this procedure are in the results section.



                     POC GLUCOSE         Routine             2018  



                                         11:42 PM CDT  

 

                                        



Results for this procedure are in the results section.



                     POC GLUCOSE         Routine             2018  



                                         5:47 PM CDT  

 

                                        



Results for this procedure are in the results section.



                     PARTIAL THROMBOPLASTIN     Timed               2018  



                           TIME (PTT)                5:15 PM CDT  

 

                                        



Results for this procedure are in the results section.



                     XR BONE SURVEY SKELETAL     Routine             2018  



                                         4:23 PM CDT  

 

                                        



Results for this procedure are in the results section.



                     US DUPLEX VENOUS UPPER     Routine             2018  



                           EXTREMITY LEFT            2:33 PM CDT  

 

                                        



Results for this procedure are in the results section.



                     US DUPLEX VENOUS LOWER     Routine             2018  



                           EXTREMITY BILATERAL       2:33 PM CDT  

 

                                        



Results for this procedure are in the results section.



                     POC GLUCOSE         Routine             2018  



                                         1:13 PM CDT  

 

                                        



Results for this procedure are in the results section.



                     POC GLUCOSE         Routine             2018  



                                         12:17 PM CDT  

 

                                        



Results for this procedure are in the results section.



                     CT HEAD WO CONTRAST     STAT                2018  



                                         10:27 AM CDT  

 

                                        



Results for this procedure are in the results section.



                     PARTIAL THROMBOPLASTIN     Routine             2018  



                           TIME (PTT)                9:21 AM CDT  

 

                                        



Results for this procedure are in the results section.



                     VITAMIN D 25 HYDROXY     Routine             2018  



                           LEVEL                     8:59 AM CDT  

 

                                        



Results for this procedure are in the results section.



                     POC GLUCOSE         Routine             2018  



                                         8:21 AM CDT  

 

                                        



Results for this procedure are in the results section.



                     ZZESTIMATED GFR     Routine             2018  



                                         1:43 AM CDT  

 

                                        



Results for this procedure are in the results section.



                     VITAMIN B12 LEVEL     Routine             2018  



                                         1:43 AM CDT  

 

                                        



Results for this procedure are in the results section.



                     THYROID STIMULATING     Routine             2018  



                           HORMONE                   1:43 AM CDT  

 

                                        



Results for this procedure are in the results section.



                     T4, FREE            Routine             2018  



                                         1:43 AM CDT  

 

                                        



Results for this procedure are in the results section.



                     COMPREHENSIVE METABOLIC     Routine             2018  



                           PANEL                     1:43 AM CDT  

 

                                        



Results for this procedure are in the results section.



                     RHEUMATOID FACTOR     Routine             2018  



                                         1:43 AM CDT  

 

                                        



Results for this procedure are in the results section.



                     LIPID PANEL         Routine             2018  



                                         1:43 AM CDT  

 

                                        



Results for this procedure are in the results section.



                     HOMOCYSTINE, PLASMA     Routine             2018  



                                         1:43 AM CDT  

 

                                        



Results for this procedure are in the results section.



                     C-REACTIVE PROTEIN     Routine             2018  



                                         1:43 AM CDT  

 

                                        



Results for this procedure are in the results section.



                     MAGNESIUM LEVEL     Routine             2018  



                                         1:43 AM CDT  

 

                                        



Results for this procedure are in the results section.



                     SEDIMENTATION RATE     Routine             2018  



                                         1:20 AM CDT  

 

                                        



Results for this procedure are in the results section.



                     HEMOGLOBIN A1C      Routine             2018  



                                         1:20 AM CDT  

 

                                        



Results for this procedure are in the results section.



                     FOLATE RBC (GROUP TEST)     Routine             2018  



                                         1:20 AM CDT  

 

                                        



Results for this procedure are in the results section.



                     CBC WITH PLATELET AND     Routine             2018  



                           DIFFERENTIAL              1:20 AM CDT  

 

                                        



Results for this procedure are in the results section.



                     PARTIAL THROMBOPLASTIN     Timed               2018  



                           TIME (PTT)                1:00 AM CDT  

 

                                        



Results for this procedure are in the results section.



                     PROTHROMBIN TIME WITH INR     Routine             2018  



                                         6:40 PM CDT  

 

                                        



Results for this procedure are in the results section.



                     PARTIAL THROMBOPLASTIN     Routine             2018  



                           TIME (PTT)                6:40 PM CDT  

 

                                        



Results for this procedure are in the results section.



                     BLOOD CULTURE, AEROBIC &     Routine             2018  



                           ANAEROBIC                 6:35 PM CDT  

 

                                        



Results for this procedure are in the results section.



                     POC GLUCOSE         Routine             2018  



                                         6:06 PM CDT  

 

                                        



Results for this procedure are in the results section.



                     CT HEAD WO CONTRAST     Routine             2018  



                                         3:24 PM CDT  

 

                                        



Results for this procedure are in the results section.



                     ECG 12-LEAD         Routine             2018  



                                         2:07 PM CDT  

 

                                        



Results for this procedure are in the results section.



                     POC GLUCOSE         Routine             2018  



                                         1:52 PM CDT  

 

                                        



Results for this procedure are in the results section.



                     ECHOCARDIOGRAM 2D     Routine             2018  



                           COMPLETE W MMODE SPECTRAL      12:20 PM CDT  



                                         COLOR DOPPLER (83858)    

 

                                        



Results for this procedure are in the results section.



                     POC GLUCOSE         Routine             2018  



                                         10:10 AM CDT  

 

                                        



Results for this procedure are in the results section.



                     XR CHEST 2 VW       Routine             2018  



                                         8:17 AM CDT  

 

                                        



Results for this procedure are in the results section.



                     URINALYSIS SCREEN AND     Routine             2018  



                           MICROSCOPY, WITH REFLEX      1:18 AM CDT  



                                         TO CULTURE    

 

                                        



Results for this procedure are in the results section.



                     URINE CULTURE       Routine             2018  



                                         1:18 AM CDT  

 

                                        



Results for this procedure are in the results section.



                     HC COMPLETE BLD COUNT     Routine             2018  



                           W/AUTO DIFF               12:15 AM CDT  

 

                                        



Results for this procedure are in the results section.



                     ZZESTIMATED GFR     Routine             2018  



                                         11:30 PM CDT  

 

                                        



Results for this procedure are in the results section.



                     COMPREHENSIVE METABOLIC     Routine             2018  



                           PANEL                     11:30 PM CDT  

 

                                        



Results for this procedure are in the results section.



                     POC GLUCOSE         Routine             2018  



                                         5:50 PM CST  

 

                                        



Results for this procedure are in the results section.



                     POC GLUCOSE         Routine             2018  



                                         12:17 PM CST  

 

                                        



Results for this procedure are in the results section.



                     POC GLUCOSE         Routine             2018  



                                         7:53 AM CST  

 

                                        



Results for this procedure are in the results section.



                     MANUAL DIFFERENTIAL     Routine             2018  



                                         5:30 AM CST  

 

                                        



Results for this procedure are in the results section.



                     ZZESTIMATED GFR     Routine             2018  



                                         5:30 AM CST  

 

                                        



Results for this procedure are in the results section.



                     BASIC METABOLIC PANEL     Routine             2018  



                                         5:30 AM CST  

 

                                        



Results for this procedure are in the results section.



                     CBC WITH PLATELET AND     Routine             2018  



                           DIFFERENTIAL              5:30 AM CST  

 

                                        



Results for this procedure are in the results section.



                     POC GLUCOSE         Routine             2018  



                                         10:14 PM CST  

 

                                        



Results for this procedure are in the results section.



                     POC GLUCOSE         Routine             2018  



                                         6:45 PM CST  

 

                                        



Results for this procedure are in the results section.



                     POC GLUCOSE         Routine             2018  



                                         6:16 AM CST  

 

                                        



Results for this procedure are in the results section.



                     POC GLUCOSE         Routine             2018  



                                         5:56 AM CST  

 

                                        



Results for this procedure are in the results section.



                     POC GLUCOSE         Routine             2018  



                                         5:20 AM CST  

 

                                        



Results for this procedure are in the results section.



                     ZZESTIMATED GFR     Routine             2018  



                                         4:00 AM CST  

 

                                        



Results for this procedure are in the results section.



                     C-REACTIVE PROTEIN     Routine             2018  



                                         4:00 AM CST  

 

                                        



Results for this procedure are in the results section.



                     COMPREHENSIVE METABOLIC     Routine             2018  



                           PANEL                     4:00 AM CST  

 

                                        



Results for this procedure are in the results section.



                     MANUAL DIFFERENTIAL     Routine             2018  



                                         3:30 AM CST  

 

                                        



Results for this procedure are in the results section.



                     SEDIMENTATION RATE     Routine             2018  



                                         3:30 AM CST  

 

                                        



Results for this procedure are in the results section.



                     CBC WITH PLATELET AND     Routine             2018  



                           DIFFERENTIAL              3:30 AM CST  

 

                                        



Results for this procedure are in the results section.



                     POC GLUCOSE         Routine             2018  



                                         9:17 PM CST  

 

                                        



Results for this procedure are in the results section.



                     POC GLUCOSE         Routine             2018  



                                         4:51 PM CST  

 

                                        



Results for this procedure are in the results section.



                 XR PICC CHEST PORTABLE     Routine         2018      Cellulitis of left leg 



                                         4:13 PM CST  

 

                                        



Results for this procedure are in the results section.



                     HC CATH DUAL LUMEN PICC     Routine             2018  



                                         3:49 PM CST  

 

                                        



Results for this procedure are in the results section.



                     HC US GUIDED VASCULAR     Routine             2018  



                           ACCESS                    3:49 PM CST  

 

                                        



Results for this procedure are in the results section.



                     HC CVL PICC INSERT 5 YRS     Routine             2018  



                           OR >                      3:49 PM CST  

 

                                        



Results for this procedure are in the results section.



                     POC GLUCOSE         Routine             2018  



                                         11:28 AM CST  

 

                                        



Results for this procedure are in the results section.



                     POC GLUCOSE         Routine             2018  



                                         7:17 AM CST  

 

                                        



Results for this procedure are in the results section.



                     POC GLUCOSE         Routine             2018  



                                         9:30 PM CST  

 

                                        



Results for this procedure are in the results section.



                     POC GLUCOSE         Routine             2018  



                                         4:58 PM CST  

 

                                        



Results for this procedure are in the results section.



                     POC GLUCOSE         Routine             2018  



                                         1:06 PM CST  

 

                                        



Results for this procedure are in the results section.



                     VANCOMYCIN LEVEL, TROUGH     Timed               2018  



                                         9:38 AM CST  

 

                                        



Results for this procedure are in the results section.



                     POC GLUCOSE         Routine             2018  



                                         7:47 AM CST  

 

                                        



Results for this procedure are in the results section.



                     ZZESTIMATED GFR     Routine             2018  



                                         7:00 AM CST  

 

                                        



Results for this procedure are in the results section.



                     BASIC METABOLIC PANEL     Routine             2018  



                                         7:00 AM CST  

 

                                        



Results for this procedure are in the results section.



                     CBC HEMOGRAM        Routine             2018  



                                         7:00 AM CST  

 

                                        



Results for this procedure are in the results section.



                     POC GLUCOSE         Routine             01/15/2018  



                                         8:37 PM CST  

 

                                        



Results for this procedure are in the results section.



                     XR FOOT 3+ VW LEFT     Routine             01/15/2018  



                                         7:52 PM CST  

 

                                        



Results for this procedure are in the results section.



                     POC GLUCOSE         Routine             01/15/2018  



                                         6:40 PM CST  

 

                                        



Results for this procedure are in the results section.



                     GRAM STAIN          Routine             01/15/2018  



                                         6:37 PM CST  

 

                                        



Results for this procedure are in the results section.



                     AFB STAIN           Routine             01/15/2018  



                                         6:37 PM CST  

 

                                        



Results for this procedure are in the results section.



                     FUNGUS SMEAR        Routine             01/15/2018  



                                         6:37 PM CST  

 

                                        



Results for this procedure are in the results section.



                     AEROBIC CULTURE     Routine             01/15/2018  



                                         6:37 PM CST  

 

                                        



Results for this procedure are in the results section.



                     AFB CULTURE         Routine             01/15/2018  



                                         6:37 PM CST  

 

                                        



Results for this procedure are in the results section.



                     ANAEROBIC CULTURE     Routine             01/15/2018  



                                         6:37 PM CST  

 

                                        



Results for this procedure are in the results section.



                     FUNGUS CULTURE      Routine             01/15/2018  



                                         6:37 PM CST  

 

                                        



Results for this procedure are in the results section.



                     POC GLUCOSE         Routine             01/15/2018  



                                         12:21 PM CST  

 

                                        



Results for this procedure are in the results section.



                     POC GLUCOSE         Routine             01/15/2018  



                                         7:49 AM CST  

 

                                        



Results for this procedure are in the results section.



                     POC GLUCOSE         Routine             2018  



                                         11:00 PM CST  

 

                                        



Results for this procedure are in the results section.



                     POC GLUCOSE         Routine             2018  



                                         5:40 PM CST  

 

                                        



Results for this procedure are in the results section.



                     POC GLUCOSE         Routine             2018  



                                         1:13 PM CST  

 

                                        



Results for this procedure are in the results section.



                     POC GLUCOSE         Routine             2018  



                                         8:19 AM CST  

 

                                        



Results for this procedure are in the results section.



                     ZZESTIMATED GFR     Routine             2018  



                                         4:50 AM CST  

 

                                        



Results for this procedure are in the results section.



                     HC COMPLETE BLD COUNT     Routine             2018  



                           W/AUTO DIFF               4:50 AM CST  

 

                                        



Results for this procedure are in the results section.



                     BASIC METABOLIC PANEL     Routine             2018  



                                         4:50 AM CST  

 

                                        



Results for this procedure are in the results section.



                     POC GLUCOSE         Routine             2018  



                                         10:25 PM CST  

 

                                        



Results for this procedure are in the results section.



                     POC GLUCOSE         Routine             2018  



                                         7:21 PM CST  

 

                                        



Results for this procedure are in the results section.



                     MRI LOWER EXTREMITY W WO     Routine             2018  



                           CONTRAST LEFT             6:35 PM CST  

 

                                        



Results for this procedure are in the results section.



                     GRAM STAIN          Routine             2018  



                                         4:51 PM CST  

 

                                        



Results for this procedure are in the results section.



                     ANAEROBIC CULTURE     Routine             2018  



                                         4:51 PM CST  

 

                                        



Results for this procedure are in the results section.



                     AEROBIC CULTURE     Routine             2018  



                                         4:51 PM CST  

 

                                        



Results for this procedure are in the results section.



                     POC GLUCOSE         Routine             2018  



                                         7:54 AM CST  

 

                                        



Results for this procedure are in the results section.



                     LACTIC ACID LEVEL, SEPSIS     Timed               2018  



                           - NOW AND REPEAT 2X EVERY      5:10 AM CST  



                                         3 HOURS    

 

                                        



Results for this procedure are in the results section.



                     POC GLUCOSE         Routine             2018  



                                         5:08 AM CST  

 

                                        



Results for this procedure are in the results section.



                     LACTIC ACID LEVEL, SEPSIS     Timed               2018  



                           - NOW AND REPEAT 2X EVERY      1:07 AM CST  



                                         3 HOURS    

 

                                        



Results for this procedure are in the results section.



                     US DUPLEX VENOUS LOWER     STAT                2018  



                           EXTREMITY LEFT            11:19 PM CST  

 

                                        



Results for this procedure are in the results section.



                     US DUPLEX ARTERIAL LOWER     STAT                2018  



                           EXTREMITY LEFT            10:45 PM CST  

 

                                        



Results for this procedure are in the results section.



                     ZZESTIMATED GFR     STAT                2018  



                                         5:33 PM CST  

 

                                        



Results for this procedure are in the results section.



                     TROPONIN            STAT                2018  



                                         5:33 PM CST  

 

                                        



Results for this procedure are in the results section.



                     B NATRIURETIC PEPTIDE     STAT                2018  



                                         5:33 PM CST  

 

                                        



Results for this procedure are in the results section.



                     LACTIC ACID LEVEL, SEPSIS     STAT                2018  



                           - NOW AND REPEAT 2X EVERY      5:33 PM CST  



                                         3 HOURS    

 

                                        



Results for this procedure are in the results section.



                     COMPREHENSIVE METABOLIC     STAT                2018  



                           PANEL                     5:33 PM CST  

 

                                        



Results for this procedure are in the results section.



                     PARTIAL THROMBOPLASTIN     STAT                2018  



                           TIME (PTT)                5:33 PM CST  

 

                                        



Results for this procedure are in the results section.



                     PROTHROMBIN TIME WITH INR     STAT                2018  



                                         5:33 PM CST  

 

                                        



Results for this procedure are in the results section.



                     HC COMPLETE BLD COUNT     STAT                2018  



                           W/AUTO DIFF               5:33 PM CST  

 

                                        



Results for this procedure are in the results section.



                     BLOOD CULTURE, AEROBIC &     Routine             2018  



                           ANAEROBIC                 5:33 PM CST  

 

                                        



Results for this procedure are in the results section.



                     BLOOD CULTURE, AEROBIC &     Routine             2018  



                           ANAEROBIC                 5:33 PM CST  



after 2017



Results

* POC glucose (2018 12:52 PM CDT)



Only the most recent of 51 results within the time period is included.





   



                 Component       Value           Ref Range       Performed At

 

   



                 POC glucose     242 (H)         65 - 99 mg/dL     [facility] DEPARTMENT OF



                           Comment:                  PATHOLOGY AND



                           [facility] Notified RN           GENOMIC MEDICINE



                                         Meter ID: [id number]  



                                         : Yonatan Merida  









   



                 Performing Organization     Address         City/[facility]     Phone Number

 

   



                     [facility] DEPARTMENT OF     [address] [place] [address] 



                                         PATHOLOGY AND GENOMIC   



                                         MEDICINE   





* FL Modified Barium Swallow (2018  2:32 PM CDT)





 



                           Narrative                 Performed At

 

 



                           EXAMINATION:FL MODIFIED BARIUM SWALLOW      [facility]



                                         CLINICAL HISTORY:Dysphagia following cerebral infarction 



                                         COMPARISON:None. 



                                         RADIATION EXPOSURE: 8.8 mGy air kerma 



                                         TECHNIQUE: 



                                         The patient swallowed varying consistencies of barium under direct lateral 



                                         fluoroscopic evaluation. The study was performed in conjunction with speech 



                                         pathology. 



                                         IMPRESSION: 



                                         There is flash laryngeal penetration during swallowing of thin and nectar 



                                         liquids. No aspiration was seen. 



                                         Please refer to Speech Pathology report for further details. 



                                         [facility]-[id number] 









                                        Procedure Note

 

                                        



 Interface, Radiology Results Incoming - 2018  2:56 PM CDT



EXAMINATION:  FL MODIFIED BARIUM SWALLOW



CLINICAL HISTORY:  Dysphagia following cerebral infarction



COMPARISON:  None.



RADIATION EXPOSURE: 8.8 mGy air kerma



TECHNIQUE:



The patient swallowed varying consistencies of barium under direct lateral 
fluoroscopic evaluation. The study was performed in conjunction with speech 
pathology.



IMPRESSION:



There is flash laryngeal penetration during swallowing of thin and nectar 
liquids. No aspiration was seen.



Please refer to Speech Pathology report for further details.



[facility]-[id number]











   



                 Performing Organization     Address         City/[facility]     Phone Number

 

   



                      [facility]          [address] [place] [address] 





* Estimated GFR (2018  4:45 AM CDT)



Only the most recent of 9 results within the time period is included.





   



                 Component       Value           Ref Range       Performed At

 

   



                 GFR Non Af Amer     46 (A)          mL/min/1.73 m2     [facility] DEPARTMENT OF



                                         PATHOLOGY AND



                                         GENOMIC MEDICINE

 

   



                 GFR Af Amer     56 (A)          mL/min/1.73 m2     [facility] DEPARTMENT OF



                           Comment:                  PATHOLOGY AND



                           Chronic kidney disease: <60      GENOMIC MEDICINE



                                         mL/min/1.73m2  



                                         Kidney failure: <15  



                                         mL/min/1.73m2  



                                         The estimated GFR is  



                                         calculated from the  



                                         IDMS-traceable Modification of  



                                         Diet  



                                         in Renal Disease Equation. The  



                                         accuracy of the calculation is  



                                         poor when the  



                                         creatinine is normal.  



                                         Calculated values >90  



                                         mL/min/1.73m2 are not  



                                         reported.  



                                         This equation has not been  



                                         validated in children (<18  



                                         years), pregnant  



                                         women, the elderly (>70  



                                         years), or ethnic groups other  



                                         than Caucasians and  



                                          Americans.  













                                         Specimen

 





                                         Plasma specimen









   



                 Performing Organization     Address         City/[facility]     Phone Number

 

   



                     [place] [address] 



                                         PATHOLOGY AND [employer]   



                                         MEDICINE   





* Partial thromboplastin time, activated (2018  4:45 AM CDT)



Only the most recent of 9 results within the time period is included.





   



                 Component       Value           Ref Range       Performed At

 

   



                 PTT             30.5            23.0 - 36.0 sec     [facility] DEPARTMENT OF



                           Comment:                  PATHOLOGY AND



                           PTT therapeutic range for      [facility]



                                         unfractionated heparin is  



                                         61.0-112.0 seconds which  



                                         corresponds to Anti-Xa  



                                         0.3-0.7 U/ml.  













                                         Specimen

 





                                         Blood









   



                 Performing Organization     Address         City/[facility]     Phone Number

 

   



                     [place] [address] 



                                         PATHOLOGY AND [facility]   



                                         MEDICINE   





* CBC with platelet and differential (2018  4:45 AM CDT)



Only the most recent of 8 results within the time period is included.





   



                 Component       Value           Ref Range       Performed At

 

   



                 WBC             8.22            4.50 - 11.00 k/uL     [facility] DEPARTMENT OF



                                         PATHOLOGY AND



                                         GENOMIC MEDICINE

 

   



                 RBC             4.54            4.40 - 6.00 m/uL     [facility] DEPARTMENT OF



                                         PATHOLOGY AND



                                         GENOMIC MEDICINE

 

   



                 HGB             13.2 (L)        14.0 - 18.0 g/dL     [facility] DEPARTMENT OF



                                         PATHOLOGY AND



                                         GENOMIC MEDICINE

 

   



                 HCT             39.7 (L)        41.0 - 51.0 %     [facility] DEPARTMENT OF



                                         PATHOLOGY AND



                                         GENOMIC MEDICINE

 

   



                 MCV             87.4            82.0 - 100.0 fL     [facility] DEPARTMENT OF



                                         PATHOLOGY AND



                                         GENOMIC MEDICINE

 

   



                 MCH             29.1            27.0 - 34.0 pg     [facility] DEPARTMENT OF



                                         PATHOLOGY AND



                                         GENOMIC MEDICINE

 

   



                 MCHC            33.2            31.0 - 37.0 g/dL     [facility] DEPARTMENT OF



                                         PATHOLOGY AND



                                         GENOMIC MEDICINE

 

   



                 RDW - SD        44.3            37.0 - 55.0 fL     [facility] DEPARTMENT OF



                                         PATHOLOGY AND



                                         GENOMIC MEDICINE

 

   



                 MPV             11.0            8.8 - 13.2 fL     [facility] DEPARTMENT OF



                                         PATHOLOGY AND



                                         GENOMIC MEDICINE

 

   



                 Platelet count     194             150 - 400 k/uL     [facility] DEPARTMENT OF



                                         PATHOLOGY AND



                                         GENOMIC MEDICINE

 

   



                 Nucleated RBC     0.00            /100 WBC        [facility] DEPARTMENT OF



                                         PATHOLOGY AND



                                         GENOMIC MEDICINE

 

   



                 Neutrophils     72.7 (H)        39.0 - 69.0 %     [facility] DEPARTMENT OF



                                         PATHOLOGY AND



                                         GENOMIC MEDICINE

 

   



                 Lymphocytes     14.0 (L)        25.0 - 45.0 %     [facility] DEPARTMENT OF



                                         PATHOLOGY AND



                                         GENOMIC MEDICINE

 

   



                 Monocytes       11.7 (H)        0.0 - 10.0 %     [facility] DEPARTMENT OF



                                         PATHOLOGY AND



                                         GENOMIC MEDICINE

 

   



                 Eosinophils     1.0             0.0 - 5.0 %     [facility] DEPARTMENT OF



                                         PATHOLOGY AND



                                         GENOMIC MEDICINE

 

   



                 Basophils       0.2             0.0 - 1.0 %     [facility] DEPARTMENT OF



                                         PATHOLOGY AND



                                         GENOMIC MEDICINE

 

   



                 Immature granulocytes     0.4Comment: "Immature     0.0 - 1.0 %     [facility] DEPARTMENT OF





                           granulocytes"  (promyelocytes,      PATHOLOGY AND



                           myelocytes, metamyelocytes)      GENOMIC MEDICINE













                                         Specimen

 





                                         Blood









   



                 Performing Organization     Address         City/[facility]/Zipcode     Phone Number

 

   



                     [place] [address] 



                                         PATHOLOGY AND GENOMIC   



                                         MEDICINE   





* Magnesium level (2018  4:45 AM CDT)



Only the most recent of 3 results within the time period is included.





   



                 Component       Value           Ref Range       Performed At

 

   



                 Magnesium       2.1             1.6 - 2.4 mg/dL     [facility] DEPARTMENT OF



                                         PATHOLOGY AND



                                         GENOMIC MEDICINE













                                         Specimen

 





                                         Plasma specimen









   



                 Performing Organization     Address         City/[facility]     Phone Number

 

   



                     [place] [address] 



                                         PATHOLOGY [facility]   





* Basic metabolic panel (2018  4:45 AM CDT)



Only the most recent of 5 results within the time period is included.





   



                 Component       Value           Ref Range       Performed At

 

   



                 Sodium          138             135 - 148 mEq/L     [facility] DEPARTMENT OF



                                         PATHOLOGY AND



                                         GENOMIC MEDICINE

 

   



                 Potassium       3.5             3.5 - 5.0 mEq/L     [facility] DEPARTMENT OF



                                         PATHOLOGY AND



                                         GENOMIC MEDICINE

 

   



                 Chloride        100             98 - 112 mEq/L     [facility] DEPARTMENT OF



                                         PATHOLOGY AND



                                         GENOMIC MEDICINE

 

   



                 CO2             26              24 - 31 mEq/L     [facility] DEPARTMENT OF



                                         PATHOLOGY AND



                                         GENOMIC MEDICINE

 

   



                 Anion gap       12@ANIO         7 - 15 mEq/L     [facility] DEPARTMENT OF



                                         PATHOLOGY AND



                                         GENOMIC MEDICINE

 

   



                 BUN             27 (H)          8 - 23 mg/dL     [facility] DEPARTMENT OF



                                         PATHOLOGY AND



                                         GENOMIC MEDICINE

 

   



                 Creatinine      1.5 (H)         0.70 - 1.20 mg/dL     [facility] DEPARTMENT OF



                                         PATHOLOGY AND



                                         GENOMIC MEDICINE

 

   



                 Glucose         218 (H)         65 - 99 mg/dL     [facility] DEPARTMENT OF



                                         PATHOLOGY AND



                                         GENOMIC MEDICINE

 

   



                 Calcium         8.1 (L)         8.8 - 10.2 mg/dL     [facility] DEPARTMENT OF



                                         PATHOLOGY AND



                                         GENOMIC MEDICINE













                                         Specimen

 





                                         Plasma specimen









   



                 Performing Organization     Address         City/State/Zipcode     Phone Number

 

   



                     [facility] DEPARTMENT OF     [address] [place] [address] 



                                         PATHOLOGY AND GENOMIC   



                                         MEDICINE   





* EEG (routine) (09/10/2018 10:35 AM CDT)





 



                           Narrative                 Performed At

 

 



                                         This result has an attachment that is not available. 



                                         EEG AWAKE AND DROWSY 



                                         Date of Service: 9/10/18 



                                         Awake Recordings: The occipital dominant rhythm is 6-7 Hz and is poorly 



                                         sustained. Low voltage 4-5 Hz and 18-22 Hz activity was present in all 



                                         regions.1.5-3 Hz activity was recorded in the right frontal central 



                                         temporal region. 



                                         Sleep Recording: No sleep was recorded. 



                                         Hyperventilation: Was not performed. 



                                         Photic Stimulation: Was not performed. 



                                         Impression: The findings are consistent with a diffuse disturbance in 



                                         brain function with a focal lesion in the right frontal central temporal 



                                         region. No seizures occurred. 



                                         ICD-10 Code: R569 





* MRA Neck Wo Contrast (09/10/2018  7:38 AM CDT)





 



                           Narrative                 Performed At

 

 



                           EXAMINATION: MRA NECK WO CONTRAST      [facility]



                                         CLINICAL HISTORY: STROKE. Evaluate for artery stenosis 



                                         COMPARISON:None 



                                         TECHNIQUE: 2-D and 3-D Time-of-flight MRA images of the cervical vessels were 



                                         obtained with multiplanar and 3-D reconstructive algorithms. 



                                         FINDINGS: 



                                         The left internal carotid artery is occluded just distal to its origin with 100%

 



                                         stenosis by NASCET criteria. There is no significant stenosis in the right 



                                         internal carotid artery by NASCET criteria. The left vertebral artery is 



                                         dominant with tiny right 



                                         vertebral artery in the cervical region.There is no significant focal 



                                         stenosis in the vertebral arteries. The common carotid arteries do not show 



                                         significant focal stenosis. 



                                         IMPRESSION: 



                                         The left internal carotid artery is occluded just distal to its origin with 100%

 



                                         stenosis by NASCET criteria. 



                                         [facility]-[id number] 









                                        Procedure Note

 

                                        



[facility], Radiology Results Incoming - 09/10/2018  8:12 AM CDT



EXAMINATION: MRA NECK WO CONTRAST



CLINICAL HISTORY: STROKE. Evaluate for artery stenosis



COMPARISON:  None



TECHNIQUE: 2-D and 3-D Time-of-flight MRA images of the cervical vessels were 
obtained with multiplanar and 3-D reconstructive algorithms.



FINDINGS:



The left internal carotid artery is occluded just distal to its origin with 100%
stenosis by NASCET criteria. There is no significant stenosis in the right 
internal carotid artery by NASCET criteria. The left vertebral artery is 
dominant with tiny right 

vertebral artery in the cervical region.  There is no significant focal stenosis
in the vertebral arteries. The common carotid arteries do not show significant 
focal stenosis.





IMPRESSION:



The left internal carotid artery is occluded just distal to its origin with 100%
stenosis by NASCET criteria. 















[facility]-[id number]











   



                 Performing Organization     Address         City/State/Zipcode     Phone Number

 

   



                     [facility]          [address] [place] [address] 





* MRA Head Wo Contrast (09/10/2018  7:22 AM CDT)





 



                           Narrative                 Performed At

 

 



                           EXAMINATION: MRA HEAD WO CONTRAST     [facility]



                                         CLINICAL HISTORY: STROKE. Evaluate for artery stenosis. 



                                         COMPARISON:None 



                                         TECHNIQUE: Time-of-flight MRA images of the [place] vessels were 



                                         obtained with multiplanar and 3-D reconstructive algorithms. 



                                         FINDINGS: 



                                         There is flow in the anterior and both posterior communicating arteries. The 



                                         distal left internal carotid artery is occluded with a small amount of flow 



                                         beginning just below the posterior communicating artery extending up to the 



                                         bifurcation. The left A1 



                                         segment is smaller than the right. There is no significant focal stenosis in the

 



                                         anterior, middle or posterior cerebral arteries. The study does not include the

 



                                         distal portion of the right vertebral artery which was hypoplastic in the 



                                         cervical region. 



                                         The distal most portion of the left vertebral artery and the basilar artery do 





                                         not show significant focal stenosis. 



                                         IMPRESSION: 



                                         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 





                                         the anterior and posterior circulation. 



                                         [facility] [place]-[id number] 









                                        Procedure Note

 

                                        



[facility], Radiology Results Incoming - 09/10/2018 12:02 PM CDT



EXAMINATION: MRA HEAD WO CONTRAST



CLINICAL HISTORY: STROKE. Evaluate for artery stenosis.



COMPARISON:  None



TECHNIQUE: Time-of-flight MRA images of the [place] vessels were 
obtained with multiplanar and 3-D reconstructive algorithms.



FINDINGS:



There is flow in the anterior and both posterior communicating arteries. The 
distal left internal carotid artery is occluded with a small amount of flow 
beginning just below the posterior communicating artery extending up to the 
bifurcation. The left A1 

segment is smaller than the right. There is no significant focal stenosis in the
anterior, middle or posterior cerebral arteries. The study does not include the 
distal portion of the right vertebral artery which was hypoplastic in the 
cervical region. 

The distal most portion of the left vertebral artery and the basilar artery do 
not show significant focal stenosis.



IMPRESSION:



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 
the anterior and posterior circulation.



[facility] [place]-[id number]











   



                 Performing Organization     Address         City/State/Zipcode     Phone Number

 

   



                     [facility]          [address] [place] [address] 





* MRI Brain Wo Contrast (09/10/2018  7:10 AM CDT)





 



                           Narrative                 Performed At

 

 



                           This result has an attachment that is not available.      [facility]



                                         EXAMINATION: MRI BRAIN WO CONTRAST 



                                         CLINICAL HISTORY: NEURO DEFICITACUTESINGLEPROGRESSING, STROKE 



                                         COMPARISON:CT brain from yesterday. MRI brain from 2018. 



                                         TECHNIQUE: Multiplanar and multisequence MRI imaging of the brain was obtained 





                                         without contrast. 



                                         FINDINGS: 



                                         As seen on the CT exam, there is a recent stroke in the right paracentral 



                                         frontal lobe in the anterior cerebral artery territory. There is localized mass

 



                                         effect. There is no definite evidence of acute hemorrhage in this region. 



                                         There is nonspecific enlargement of the ventricles and extra axial space in the

 



                                         supra and infratentorial region and there are nonspecific white matter changes.

 



                                         There is old infarct with old hemorrhage in the left posterior frontal lobe and

 



                                         left frontal 



                                         operculum. There are some areas of blooming in the right parietal cortex and 



                                         periventricular region consistent with old hemorrhagic products or 



                                         calcification. There is old infarct in the left basal ganglia. There are old 



                                         infarcts or perivascular spaces 



                                         in the thalami. There are old infarcts in the loren and cerebellum. 



                                         There is pseudophakia. 



                                         IMPRESSION: 



                                         Recent infarct in the right anterior cerebral artery territory as seen on the CT

 



                                         exam. 



                                         Old areas of infarction some which have old hemorrhage. 



                                         [facility] [place]-[id number] 









                                        Procedure Note

 

                                        



 Interface, Radiology Results Incoming - 09/10/2018 12:01 PM CDT



EXAMINATION: MRI BRAIN WO CONTRAST



CLINICAL HISTORY: NEURO DEFICIT  ACUTE  SINGLE  PROGRESSING, STROKE



COMPARISON:  CT brain from yesterday. MRI brain from 2018.



TECHNIQUE: Multiplanar and multisequence MRI imaging of the brain was obtained 
without contrast.





FINDINGS:



As seen on the CT exam, there is a recent stroke in the right paracentral 
frontal lobe in the anterior cerebral artery territory. There is localized mass 
effect. There is no definite evidence of acute hemorrhage in this region.



There is nonspecific enlargement of the ventricles and extra axial space in the 
supra and infratentorial region and there are nonspecific white matter changes. 
There is old infarct with old hemorrhage in the left posterior frontal lobe and 
left frontal 

operculum. There are some areas of blooming in the right parietal cortex and 
periventricular region consistent with old hemorrhagic products or 
calcification. There is old infarct in the left basal ganglia. There are old 
infarcts or perivascular spaces 

in the thalami. There are old infarcts in the loren and cerebellum.



There is pseudophakia.



IMPRESSION:



Recent infarct in the right anterior cerebral artery territory as seen on the CT
exam.



Old areas of infarction some which have old hemorrhage.

















[facility] [place]-[id number]  











   



                 Performing Organization     Address         City/State/Zipcode     Phone Number

 

   



                      [facility]          [address] [place] [address] 





* XR Bone Survey Skeletal (2018  4:23 PM CDT)





 



                           Narrative                 Performed At

 

 



                           EXAMINATION:XR BONE SURVEY SKELETAL      [facility]



                                         CLINICAL HISTORY:MRI consent 



                                         COMPARISON:None. 



                                         IMPRESSION: 



                                         1.Skull: Intact. Radiopaque/metallic dentition 



                                         2.Cervical spine: Intact. 



                                         3.Thoracic spine: Intact. 



                                         4.RIBS: Intact. No radiopaque densities in the chest. 



                                         5.Lumbar spine: Intact. 



                                         6.ABDOMEN: Bowel gas pattern is normal. No radiopaque foreign body. 



                                         7.Pelvis: Intact. Atherosclerotic disease involving the abdominal aorta and

 



                                         iliac vessels. 



                                         8.Right humerus: Intact. 



                                         9.Left humerus: Intact. 



                                         10.Bilateral forearm. Intact. No radiopaque metallic foreign body. Small 



                                         vessel atherosclerotic disease. 



                                         11.Right femur: Intact. 



                                         12.Left femur: Distal femoral cortical thickening with a subcortical lucency

 



                                         likely relating to sequela of old trauma and periosteal reaction. In addition, 





                                         there are posttraumatic changes involving the proximal tibia. Calcific density 





                                         along the distal 



                                         tibial and fibular cortex may relate to periostitis or sequela of chronic lower

 



                                         extremity swelling/venous reflux. No radiopaque metallic hardware. 



                                         13.Right femur: Intact. The right tibia and fibula are intact. No radiopaque

 



                                         foreign body. 



                                         14.Overall, there is no radiopaque metallic artifact involving the 



                                         visualized body parts. 



                                         [facility] [place]-[id number] 









                                        Procedure Note

 

                                        



 Interface, Radiology Results Incoming - 2018  5:09 PM CDT



EXAMINATION:  XR BONE SURVEY SKELETAL



CLINICAL HISTORY:  MRI consent



COMPARISON:  None.





IMPRESSION:



                                        1.  Skull: Intact. Radiopaque/metallic dentition



                                        2.  Cervical spine: Intact.



                                        3.  Thoracic spine: Intact.



                                        4.  RIBS: Intact. No radiopaque densities in the chest.



                                        5.  Lumbar spine: Intact.



                                        6.  ABDOMEN: Bowel gas pattern is normal. No radiopaque foreign body.



                                        7.  Pelvis: Intact. Atherosclerotic disease involving the abdominal aorta and iliac

 vessels.



                                        8.  Right humerus: Intact.



                                        9.  Left humerus: Intact.



                                        10.  Bilateral forearm. Intact. No radiopaque metallic foreign body. Small vessel

 atherosclerotic disease.



                                        11.  Right femur: Intact.



                                        12.  Left femur: Distal femoral cortical thickening with a subcortical lucency likely

 relating to sequela of old trauma and periosteal reaction. In addition, there 
are posttraumatic changes involving the proximal tibia. Calcific density along 
the distal 

tibial and fibular cortex may relate to periostitis or sequela of chronic lower 
extremity swelling/venous reflux. No radiopaque metallic hardware.



                                        13.  Right femur: Intact. The right tibia and fibula are intact. No radiopaque foreign

 body.



                                        14.  Overall, there is no radiopaque metallic artifact involving the visualized 

body parts.







[facility] [id number]











   



                 Performing Organization     Address         City/State/Zipcode     Phone Number

 

   



                      [facility]          [address] [place] [address] 





* Pv duplex venous upper extremity (2018  2:33 PM CDT)





 



                           Narrative                 Performed At

 

 



                                [facility]





                                         Vascular Ultrasound Laboratory 



                                         Upper Extremity Venous Report 



                                          [address].Name:Deedee MOSQUEDA.ID:[id number] 





                                         .Date: 2018Refer.MD:EMMA ALBA MD 



                                         Exam Time: 2:09:00 PMStudy Type:UE 



                                         Venous 



                                         DOBAge:1950,68Y Sex: 



                                         MALE 



                                         Sonogrphr: Man Sow RN, RVTPat. 



                                         Stat.:Inpatient 



                                         [facility] 



                                         GABRIELA, 



                                         CPT - 4: 00239 Echo Event 



                                         ID:[id number] 



                                         Order ID:[id number] 



                                         Reason for Study:Left arm edema. 



                                         Procedures:Colorflow, Grayscale/2D, Pulsed wave Doppler 



                                         Race: 



                                         ------------------------------------ 



                                         SUMMARY: 



                                         ------------------------------------ 



                                         DUPLEX SCAN OBSERVATIONS 



                                          Right Left 



                                         IJ Normal 



                                         SubclavianNormal Normal 



                                         Axillary Normal 



                                         Brachial Normal 



                                         Basilic Normal 



                                         Cephalic Normal 



                                          



                                         LEFT:There is normal compressibility and no evidence of echogenic 



                                         material noted within the lumen of the visualized veins. Colorflow and 



                                         Doppler signals are normal. 



                                         PRELIMINARY FINDINGS 



                                         1.Normal venous duplex scan of the left upper extremity. 



                                         PHYSICIAN INTERPRETATION 



                                         Venous examination of the left upper extremity and neck demonstrated 



                                         no evidence of venous thrombosis. 



                                         Signed 2018 06:39 PM 



                                         Nico Burns MD, RPLEYDA 









                                        Procedure Note

 

                                        



Interface, Radiology Results In - 2018  6:39 PM CDT



                                   

                    Vascular Ultrasound Laboratory

                    Upper Extremity Venous Report

           [address] [place] [address]

                                   

Pat.Name:  FABIÁN MOSQUEDA          Pat.ID:    [id number]               

St.Date:   2018                Refer.MD:  EMMA ALBA MD  

Exam Time: 2:09:00 PM              Study Type:UE Venous               

  Age:  1950,68Y           Sex:       MALE                    

Sonogrphr: Man Sow RN, RVT  Pat. Stat.:Inpatient               

Room:      [address]  Vol: DP,                     

CPT - 4:   86755                   Echo Event ID:[id number]            

Order ID:  [id number]              

Reason for Study:Left arm edema.   

Procedures:Colorflow, Grayscale/2D, Pulsed wave Doppler

Race:                      

                                        ------------------------------------

SUMMARY:

                                        ------------------------------------

DUPLEX SCAN OBSERVATIONS

 

   Right Left

 IJ   Normal

 Subclavian  Normal Normal

 Axillary   Normal

 Brachial   Normal

 Basilic   Normal

 Cephalic   Normal

 

  

 LEFT:  There is normal compressibility and no evidence of echogenic

material noted within the lumen of the visualized veins. Colorflow and

Doppler signals are normal.   

 

 PRELIMINARY FINDINGS

 1.  Normal venous duplex scan of the left upper extremity.

 

 PHYSICIAN INTERPRETATION 

 Venous examination of the left upper extremity and neck demonstrated

no evidence of venous thrombosis.

 

 

 

Signed 2018 06:39 PM

Nico Burns MD, RPVI









   



                 Performing Organization     Address         City/State/Zipcode     Phone Number

 

   



                     [facility]            [address] [place] [address] 





* Pv duplex venous lower extremity (2018  2:33 PM CDT)



Only the most recent of 2 results within the time period is included.





 



                           Narrative                 Performed At

 

 



                                [facility]





                                         Vascular Ultrasound Laboratory 



                                         Lower Extremity Venous Report 



                                          [address] [address], [place], [place] [address].Name:Deedee MOSQUEDA.ID:[id number] 





                                         .Date: 2018Refer.MD:EMMA ALBA MD 



                                         Exam Time: 1:45:00 PMStudy Type:LE 



                                         Venous 



                                         DOBAge:1950,68Y Sex: 



                                         MALE 



                                         Sonogrphr: Man Sow RN, RVTPat. 



                                         Stat.:Inpatient 



                                         [facility] 



                                         GABRIELA, 



                                         CPT - 4: 80221 Echo Event 



                                         ID:[id number] 



                                         Order ID:[id number] 



                                         Reason for Study:Bilateral leg edema.Questionable DVT. 



                                         Procedures:Colorflow, Grayscale/2D, Pulsed wave Doppler 



                                         Race: 



                                         ------------------------------------ 



                                         SUMMARY: 



                                         ------------------------------------ 



                                         DUPLEX SCAN OBSERVATIONS 



                                          Deep VeinsSuperficial Veins 



                                         RightLeft RightLeft 



                                          GSV (prox) NormalNormal 



                                         CFV Normal Normal (above knee) 



                                         Femoral Normal Normal GSV (dist) Normal Not Visualized 



                                         Profunda Normal Normal (below knee) 



                                         Popliteal Normal Not Visualized 



                                         PT (prox) Normal Not visualizedSSV Not Visualized Not Visualized 



                                         PT (dist) Normal Not Visualized 



                                         Peroneal Normal Not Visualized 



                                         RIGHT:There is normal compressibility with no evidence of echogenic 



                                         material noted within the lumen of the visualized veins.Colorflow 



                                         and Doppler signals are normal. 



                                         LEFT:There is normal compressibility with no evidence of echogenic 



                                         material noted within the lumen of the visualized veins.Colorflow 



                                         and Doppler signals are normal. 



                                         PRELIMINARY FINDINGS 



                                         1.No evidence of deep venous thrombosis in the visualized veins. 



                                         PHYSICIAN INTERPRETATION 



                                         Venous examination of the both lower extremities demonstrated no 



                                         evidence of venous thrombosis in the visualized veins.Normal 



                                         compressibility and augmentation of all veins visualized. 



                                         Signed 2018 06:33 PM 



                                         Nico Burns MD, RPVI 









                                        Procedure Note

 

                                        



Interface, Radiology Results In - 2018  6:33 PM CDT



                                   

                    Vascular Ultrasound Laboratory

                    Lower Extremity Venous Report

           [address] [facility] [address] [id number]

                                   

Pat.Name:  FABIÁN MOSQUEDA.ID:    [id number]               

.Date:   2018                Refer.MD:  EMMA ALBA MD  

Exam Time: 1:45:00 PM              Study Type:LE Venous               

  Age:  1950,68Y           Sex:       MALE                    

Sonogrphr: Man Sow RN, RVT  Pat. Stat.:Inpatient               

Room:      [facility]                  Tape  Vol: DP,                     

CPT - 4:   [address]                   Echo Event ID:[id number]            

Order ID:  [id number]              

Reason for Study:Bilateral leg edema.  Questionable DVT.

Procedures:Colorflow, Grayscale/2D, Pulsed wave Doppler

Race:                      

                                        ------------------------------------

SUMMARY:

                                        ------------------------------------

 DUPLEX SCAN OBSERVATIONS

 

   Deep Veins  Superficial Veins

  Right  Left Right  Left

     GSV (prox) Normal  Normal

 CFV Normal Normal (above knee)

 Femoral Normal Normal GSV (dist) Normal Not Visualized

 Profunda Normal Normal (below knee)

 Popliteal Normal Not Visualized

 PT (prox) Normal Not visualized  SSV Not Visualized Not Visualized

 PT (dist) Normal Not Visualized 

 Peroneal Normal Not Visualized 

 

 RIGHT:  There is normal compressibility with no evidence of echogenic

material noted within the lumen of the visualized veins.  Colorflow

and Doppler signals are normal.

 

 LEFT:  There is normal compressibility with no evidence of echogenic

material noted within the lumen of the visualized veins.  Colorflow

and Doppler signals are normal.   

 

 PRELIMINARY FINDINGS

 1.  No evidence of deep venous thrombosis in the visualized veins.

 

 PHYSICIAN INTERPRETATION 

 Venous examination of the both lower extremities demonstrated no

evidence of venous thrombosis in the visualized veins.  Normal

compressibility and augmentation of all veins visualized.

 

Signed 2018 06:33 PM

Nico Burns MD, VI









   



                 Performing Organization     Address         City/State/Zipcode     Phone Number

 

   



                      [facility]            [address] [id number] 





* CT Head Wo Contrast (2018 10:27 AM CDT)



Only the most recent of 2 results within the time period is included.





 



                           Narrative                 Performed At

 

 



                           EXAMINATION: CT HEAD WO CONTRAST     [facility]



                                         CLINICAL HISTORY: Neuro deficit(s)subacute, Altered level of consciousness 





                                         (LOC)unexplained 



                                         COMPARISON:CT brain from yesterday. MRI the brain from 2017. 



                                         TECHNIQUE: Noncontrast enhanced images of the brain were obtained from the skull

 



                                         base to the vertex. Both soft tissue and bone reconstruction algorithms were 



                                         performed.CT scans are performed using radiation dose reduction techniques.

 



                                         Technical factors 



                                         are evaluated and adjusted to ensure appropriate moderation of exposure. 



                                         Automated dose management technology is applied to adjust radiation exposure 



                                         while achieving a diagnostic quality image. 



                                         FINDINGS: 



                                         Artifacts obscure details. 



                                         There is a large recent infarct throughout the paracentral right frontal lobe in

 



                                         the anterior cerebral artery distribution with cytotoxic and vasogenic edema and

 



                                         localized mass effect. There is no evidence of acute hemorrhage. 



                                         There are chronic changes in the rest of the brain including old infarcts which

 



                                         are stable. There is calcification in the walls of some of the arteries. There 





                                         is nonspecific increased bone density. The nasal septum is slightly deviated. 



                                         IMPRESSION: 



                                         New subacute density stroke in the right anterior cerebral artery territory. 



                                         Findings discussed with the patient's nurse Gabriela at 1031 hours. She 



                                         verbalized understanding. 



                                         [facility]-[id number] 









                                        Procedure Note

 

                                        



Hm Interface, Radiology Results Incoming - 2018 10:36 AM CDT



EXAMINATION: CT HEAD WO CONTRAST



CLINICAL HISTORY: Neuro deficit(s)  subacute, Altered level of consciousness 
(LOC)  unexplained



COMPARISON:  CT brain from yesterday. MRI the brain from 2017.



TECHNIQUE: Noncontrast enhanced images of the brain were obtained from the skull
base to the vertex. Both soft tissue and bone reconstruction algorithms were 
performed.  CT scans are performed using radiation dose reduction techniques. 
Technical factors 

are evaluated and adjusted to ensure appropriate moderation of exposure. 
Automated dose management technology is applied to adjust radiation exposure 
while achieving a diagnostic quality image.





FINDINGS:



Artifacts obscure details.



There is a large recent infarct throughout the paracentral right frontal lobe in
the anterior cerebral artery distribution with cytotoxic and vasogenic edema and
localized mass effect. There is no evidence of acute hemorrhage. 



There are chronic changes in the rest of the brain including old infarcts which 
are stable. There is calcification in the walls of some of the arteries. There 
is nonspecific increased bone density. The nasal septum is slightly deviated.





IMPRESSION:



New subacute density stroke in the right anterior cerebral artery territory.



Findings discussed with the patient's nurse Gabriela at 1031 hours. She 
verbalized understanding.









[facility]-[id number]











   



                 Performing Organization     Address         City/[facility]     Phone Number

 

   



                     [facility]          [address] [phone] 





* Vitamin D 25 hydroxy level (2018  8:59 AM CDT)





   



                 Component       Value           Ref Range       Performed At

 

   



                 Vitamin D, 25-hydroxy     9.4 (L)         30.0 - 150.0 ng/mL     [facility] DEPARTMENT OF



                           Comment:                  PATHOLOGY AND



                           This assay reports the sum of      [employer] MEDICINE



                                         25-hydroxy vitamin D3 and  



                                         25-hydroxy vitamin  



                                         D2.  



                                         Reference range:  



                                         0-17 years:  



                                         Deficiency: less than 20ng/mL  



                                         Optimum level: greater than or  



                                         equal to 20 ng/mL.  



                                         18 years and older:  



                                         Deficiency: less than 20ng/mL  



                                         Insufficiency: 20-29 ng/mL  



                                         Optimum Level: 30-80 ng/mL  



                                         The assay reportable range is  



                                         3.4155.9 ng/mL. Levels  



                                         higher than 150  



                                         ng/mL may be associated with  



                                         toxicity.  



                                         If toxicity is clinically  



                                         suspected and the reported  



                                         result is >155.9  



                                         ng/mL,contact lab for  



                                         alternative methods to obtain  



                                         a definitivelevel.  



                                         If separate quantitation of  



                                         25-hydroxy vitamin D3 and  



                                         25-hydroxy vitamin D2  



                                         is needed, please contact lab  



                                         for alternative methods.  













                                         Specimen

 





                                         Blood









   



                 Performing Organization     Address         City/[facility]     Phone Number

 

   



                     [address] [phone] 



                                         PATHOLOGY AND GENOMIC   



                                         MEDICINE   





* Homocystine, plasma (2018  1:43 AM CDT)





   



                 Component       Value           Ref Range       Performed At

 

   



                 Homocysteine     8.7             0.0 - 15.0 umol/L     [facility] DEPARTMENT OF



                           Comment:                  PATHOLOGY AND



                           The risk for coronary vascular      GENOMIC MEDICINE



                                         disease increases  



                                         progressively  



                                         with homocysteine  



                                         concentration.A 3.4 times  



                                         greater risk is  



                                         associated with a homocysteine  



                                         concentration of greater  



                                         than  



                                         15.8 umol/L as compared to a  



                                         concentration below 14.1  



                                         umol/L.  













                                         Specimen

 





                                         Plasma specimen









   



                 Performing Organization     Address         City/[facility]/Zipcode     Phone Number

 

   



                     [address] [address] 



                                         PATHOLOGY AND GENOMIC   



                                         MEDICINE   





* Rheumatoid factor (2018  1:43 AM CDT)





   



                 Component       Value           Ref Range       Performed At

 

   



                 Rheumatoid factor     <10             0 - 13 IU/mL     [facility] DEPARTMENT OF



                                         PATHOLOGY AND



                                         GENOMIC MEDICINE













                                         Specimen

 





                                         Plasma specimen









   



                 Performing Organization     Address         City/[facility]     Phone Number

 

   



                     [place] [address] 



                                         PATHOLOGY AND GENOMIC   



                                         MEDICINE   





* C-reactive protein (2018  1:43 AM CDT)



Only the most recent of 2 results within the time period is included.





   



                 Component       Value           Ref Range       Performed At

 

   



                 CRP             12.44 (H)       0.00 - 0.50 mg/dL     [facility] DEPARTMENT OF



                                         PATHOLOGY AND



                                         GENOMIC MEDICINE













                                         Specimen

 





                                         Plasma specimen









   



                 Performing Organization     Address         City/[facility]     Phone Number

 

   



                     [facility] [place] [address] 



                                         PATHOLOGY AND GENOMIC   



                                         MEDICINE   





* Thyroid stimulating hormone (2018  1:43 AM CDT)





   



                 Component       Value           Ref Range       Performed At

 

   



                 TSH             1.92            0.27 - 4.20 uIU/mL     [facility] DEPARTMENT OF



                                         PATHOLOGY AND



                                         [facility] MEDICINE













                                         Specimen

 





                                         Plasma specimen









   



                 Performing Organization     Address         City/[facility]     Phone Number

 

   



                     [place] [address] 



                                         PATHOLOGY AND GENOMIC   



                                         MEDICINE   





* T4, free (2018  1:43 AM CDT)





   



                 Component       Value           Ref Range       Performed At

 

   



                 T4, free        1.2             0.9 - 1.7 ng/dL     [facility] DEPARTMENT OF



                                         PATHOLOGY AND



                                         GENOMIC MEDICINE













                                         Specimen

 





                                         Plasma specimen









   



                 Performing Organization     Address         City/[facility]     Phone Number

 

   



                     [place] [address] 



                                         PATHOLOGY AND [facility]   



                                         MEDICINE   





* Vitamin B12 level (2018  1:43 AM CDT)





   



                 Component       Value           Ref Range       Performed At

 

   



                 Vitamin B12     426             211 - 946 pg/mL     [facility] DEPARTMENT OF



                           Comment:                  PATHOLOGY AND



                           Significant overlap [facility]      GENOMIC MEDICINE



                                         between normal and deficiency  



                                         states.  



                                         However, most patients with  



                                         deficiencies will have Serum  



                                         B12  



                                         <200  



                                         pg/mL.  



                                           



                                           



                                           













                                         Specimen

 





                                         Serum









   



                 Performing Organization     Address         City/[facility]     Phone Number

 

   



                     [facility] [place] [address] 



                                         PATHOLOGY AND GENOMIC   



                                         MEDICINE   





* Lipid panel (2018  1:43 AM CDT)





   



                 Component       Value           Ref Range       Performed At

 

   



                 Cholesterol     109             <200 mg/dL      [facility] DEPARTMENT OF



                                         PATHOLOGY AND



                                         GENOMIC MEDICINE

 

   



                 Triglycerides     84              <150 mg/dL      [facility] DEPARTMENT OF



                                         PATHOLOGY AND



                                         GENOMIC MEDICINE

 

   



                 HDL cholesterol     33 (L)          >40 mg/dL       [facility] DEPARTMENT OF



                                         PATHOLOGY AND



                                         GENOMIC MEDICINE

 

   



                 LDL cholesterol     61Comment: Result obtained by     <100 mg/dL      [facility] DEPARTMENT OF





                           direct LDL measurement      PATHOLOGY AND



                                         GENOMIC MEDICINE

 

   



                     Lipid panel         SeeBelow            [facility] DEPARTMENT OF



                     interpretation      Comment:            PATHOLOGY AND



                           Total Cholesterol         GENOMIC MEDICINE



                                         (mg/dL)  



                                         <200  



                                         Desirable  



                                         200-239Borderline  



                                         -high  



                                         >=240High  



                                           



                                           



                                           



                                         Triglycerides (mg/dL)  



                                         <150 Normal  



                                         150-199Borderline  



                                         -high  



                                         200-499High  



                                         >=500Very  



                                         high  



                                         HDL Cholesterol  



                                         (mg/dL)  



                                         <40Low  



                                         (male)  



                                         <40Low  



                                         (female)  



                                         LDL Cholesterol (mg/dL)  



                                         <100 Optimal  



                                         100-129Near or  



                                         above optimal  



                                         130-159Borderline  



                                         -high  



                                         160-189High  



                                         >=190Very  



                                         high  



                                           



                                           



                                           



                                           



                                         Risk Catergories that modify  



                                         LDL goals.  



                                         Risk  



                                         Catergories  



                                         LDL goal (mg/dL)  



                                         CHD and CHD risk  



                                         equivalent<100  



                                         (10-year risk >20%)  



                                         Multiple (2+) risk  



                                         factors <130  



                                         (10-year risk=<20%)  



                                         0-1 risk  



                                         factors  



                                          <160  



                                         (<10-year  



                                         risk)  



                                           



                                         Defining levels of lipids in  



                                         metabolic syndrome  



                                         Triglycerides  



                                         >=150  



                                         mg/dL  



                                         HDL  



                                         Cholesterol  



                                           



                                         Men  



                                           



                                         <40 mg/dL  



                                         Women  



                                           



                                         <40 mg/dL  



                                         Non-HDL cholesterol is a  



                                         second target for therapy in  



                                         persons  



                                         with high triglycerides (>=200  



                                         mg/dL)  













                                         Specimen

 





                                         Plasma specimen









   



                 Performing Organization     Address         City/State/Zipcode     Phone Number

 

   



                     [facility] DEPARTMENT OF     [address] [place] [address] 



                                         PATHOLOGY AND GENOMIC   



                                         MEDICINE   





* Comprehensive metabolic panel (2018  1:43 AM CDT)



Only the most recent of 4 results within the time period is included.





   



                 Component       Value           Ref Range       Performed At

 

   



                 Sodium          138             135 - 148 mEq/L     [facility] DEPARTMENT OF



                                         PATHOLOGY AND



                                         GENOMIC MEDICINE

 

   



                 Potassium       3.6             3.5 - 5.0 mEq/L     [facility] DEPARTMENT OF



                                         PATHOLOGY AND



                                         GENOMIC MEDICINE

 

   



                 Chloride        98              98 - 112 mEq/L     [facility] DEPARTMENT OF



                                         PATHOLOGY AND



                                         GENOMIC MEDICINE

 

   



                 CO2             26              24 - 31 mEq/L     [facility] DEPARTMENT OF



                                         PATHOLOGY AND



                                         GENOMIC MEDICINE

 

   



                 Anion gap       14@ANIO         7 - 15 mEq/L     [facility] DEPARTMENT OF



                                         PATHOLOGY AND



                                         GENOMIC MEDICINE

 

   



                 BUN             25 (H)          8 - 23 mg/dL     [facility] DEPARTMENT OF



                                         PATHOLOGY AND



                                         GENOMIC MEDICINE

 

   



                 Creatinine      1.5 (H)         0.70 - 1.20 mg/dL     [facility] DEPARTMENT OF



                                         PATHOLOGY AND



                                         GENOMIC MEDICINE

 

   



                 Glucose         257 (H)         65 - 99 mg/dL     [facility] DEPARTMENT OF



                                         PATHOLOGY AND



                                         GENOMIC MEDICINE

 

   



                 Calcium         8.2 (L)         8.8 - 10.2 mg/dL     [facility] DEPARTMENT OF



                                         PATHOLOGY AND



                                         GENOMIC MEDICINE

 

   



                 Protein         6.4             6.3 - 8.3 g/dL     [facility] DEPARTMENT OF



                           Comment:                  PATHOLOGY AND



                                 GENOMIC MEDICINE



                                          4.6-7.0 g/dL  



                                         1  



                                         week  



                                          4.4-7.6 g/dL  



                                         7  



                                         months-1year  



                                         5.1-7.3 g/dL  



                                         1-2  



                                         years5.6-7  



                                         .5 g/dL  



                                         >3  



                                         years6.0-8  



                                         .0 g/dL  



                                           



                                          6.3-8.3 g/dL  

 

   



                 Albumin         1.9 (L)         3.5 - 5.0 g/dL     [facility] DEPARTMENT OF



                                         PATHOLOGY AND



                                         GENOMIC MEDICINE

 

   



                 A/G ratio       0.4 (L)         0.7 - 3.8       [facility] DEPARTMENT OF



                                         PATHOLOGY AND



                                         GENOMIC MEDICINE

 

   



                 Alkaline phosphatase     71              40 - 129 U/L     [facility] DEPARTMENT OF



                                         PATHOLOGY AND



                                         GENOMIC MEDICINE

 

   



                 AST             91 (H)          10 - 50 U/L     [facility] DEPARTMENT OF



                                         PATHOLOGY AND



                                         GENOMIC MEDICINE

 

   



                 ALT             19              5 - 50 U/L      [facility] DEPARTMENT OF



                                         PATHOLOGY AND



                                         GENOMIC MEDICINE

 

   



                 Total bilirubin     0.7             0.0 - 1.2 mg/dL     [facility] DEPARTMENT OF



                                         PATHOLOGY AND



                                         GENOMIC MEDICINE













                                         Specimen

 





                                         Plasma specimen









   



                 Performing Organization     Address         City/[facility]     Phone Number

 

   



                     [place] [address] 



                                         PATHOLOGY AND GENOMIC   



                                         MEDICINE   





* Sedimentation rate (2018  1:20 AM CDT)



Only the most recent of 2 results within the time period is included.





   



                 Component       Value           Ref Range       Performed At

 

   



                 Sedimentation rate     46 (H)          0 - 10 mm/hr     [facility] DEPARTMENT OF



                                         PATHOLOGY AND



                                         GENOMIC MEDICINE













                                         Specimen

 





                                         Blood









   



                 Performing Organization     Address         City/[facility]     Phone Number

 

   



                     [place] [address] 



                                         PATHOLOGY AND GENOMIC   



                                         MEDICINE   





* Hemoglobin A1c (2018  1:20 AM CDT)





   



                 Component       Value           Ref Range       Performed At

 

   



                 Hemoglobin A1C     10.1 (H)        4.0 - 5.6 %     [facility] DEPARTMENT OF



                           Comment:                  PATHOLOGY AND



                           HbA1c cutoffs for diagnosing      GENOMIC MEDICINE



                                         diabetes:  



                                         4.0% - 5.6%=normal  



                                         5.7% - 6.4%=increased risk for  



                                         diabetes (prediabetes)

  



                                         >=6.5%=diabetes  



                                         Goals for glycemic control  



                                         (ADA 2016)  



                                         < 7.0%Target for  



                                         nonpregnant adults with  



                                         diabetes.  



                                         More or less stringent targets  



                                         may be appropriate for  



                                         individual patients.  



                                         <7.5% Target for Children  



                                         and adolescents with type 1  



                                         diabetes.  













                                         Specimen

 





                                         Blood









   



                 Performing Organization     Address         City/[facility]     Phone Number

 

   



                     [place] [address] 



                                         PATHOLOGY AND GENOMIC   



                                         MEDICINE   





* Folate RBC (group test) (2018  1:20 AM CDT)





   



                 Component       Value           Ref Range       Performed At

 

   



                 RBC folate      1,027           499 - 1,504 ng/mL     [facility] DEPARTMENT OF



                                         PATHOLOGY AND



                                         GENOMIC MEDICINE













                                         Specimen

 





                                         Blood









   



                 Performing Organization     Address         City/[facility]     Phone Number

 

   



                     [facility] DEPARTMENT OF     [address] [place] [address] 



                                         PATHOLOGY AND GENOMIC   



                                         MEDICINE   





* Prothrombin time with INR (2018  6:40 PM CDT)



Only the most recent of 2 results within the time period is included.





   



                 Component       Value           Ref Range       Performed At

 

   



                 Prothrombin time     15.5 (H)        12.0 - 15.0 sec     [facility] DEPARTMENT OF



                                         PATHOLOGY AND



                                         GENOMIC MEDICINE

 

   



                     INR                 1.2                 [facility] DEPARTMENT OF



                           Comment:                  PATHOLOGY AND



                           The International Normalized      GENOMIC MEDICINE



                                         Ratio (INR) is a therapeutic  



                                         monitoring tool for patients  



                                         who are stable on oral  



                                         anticoagulant therapy. An INR  



                                         of 2.0-3.0 is suggested for  



                                         deep  



                                         vein thrombosis/pulmonary  



                                         embolism.  













                                         Specimen

 





                                         Blood









   



                 Performing Organization     Address         City/[facility]     Phone Number

 

   



                     [facility] [place] [address] 



                                         PATHOLOGY AND GENOMIC   



                                         MEDICINE   





* Blood culture, aerobic & anaerobic (2018  6:35 PM CDT)



Only the most recent of 3 results within the time period is included.





   



                 Component       Value           Ref Range       Performed At

 

   



                     Blood culture isolate     No growth after 5 days of      [facility] DEPARTMENT OF



                           incubation.               PATHOLOGY AND



                           Comment:                  GENOMIC MEDICINE



                                         Specimen Information  



                                         Specimen Source: Blood  



                                         Specimen Site: Wrist Right  













                                         Specimen

 





                                         Blood









   



                 Performing Organization     Address         City/[facility]     Phone Number

 

   



                     [facility] [place] [address] 



                                         PATHOLOGY AND GENOMIC   



                                         MEDICINE   





* ECG 12 lead (2018  2:07 PM CDT)





   



                 Component       Value           Ref Range       Performed At

 

   



                     Ventricular rate     82                  [facility]

 

   



                     Atrial rate         75                  [facility]

 

   



                     QRSD interval       88                  [facility]

 

   



                     QT interval         450                 [facility]

 

   



                     QTC interval        525                 [facility]

 

   



                     QRS axis 1          108                 [facility]

 

   



                     T wave axis         63                  [facility]

 

   



                     EKG impression      Atrial fibrillation-Rightward      [facility]



                                         axis-Nonspecific ST  



                                         abnormality , probably  



                                         digitalis effect-Prolonged  



                                         QT-Abnormal ECG-In automated  



                                         comparison with ECG of  



                                         30-OCT-2017 23:26,-Atrial  



                                         fibrillation has replaced  



                                         Sinus rhythm-Nonspecific T  



                                         wave abnormality no  



                                         longer evident in Lateral  



                                         leads-Electronically Signed By  



                                         Rigoberto Eugene MD ([id number]) on  



                                         2018 9:34:08 PM  









   



                 Performing Organization     Address         City/State/Zipcode     Phone Number

 

   



                     [facility]            [address] [place] [address] 





* Echocardiogram complete w contrast and 3D if needed (2018 12:20 PM CDT)





 



                           Narrative                 Performed At

 

 



                                [facility]





                                         Echocardiography Report 



                                          [address] [place], [facility] [address], [place] [address] 



                                          



                                         Pat.Name:Deedee MOSQUEDA.ID:[id number] 





                                         .Date: 2018Refer.MD:EMMA ALBA MD 



                                         Exam Time: 11:41:00 AM Study Type:Routine 



                                         Echo 



                                         Height:72inBSA: 2.18

 



                                         m2 



                                         DOBAge:1950,68Y Sex: 



                                         MALE 



                                         BP:188/86HR:

 



                                         74 bpm 



                                         Sonogrphr: JOSEF Cavanaugh 



                                         Pat. Stat.:Inpatient 



                                         Room:[address] Study 



                                         Status:Final 



                                         Echo Event ID:[id number] 



                                         Order ID:[id number] 



                                         Reason for Study: 



                                         Etiology 



                                         - Symptoms or conditions potentially related to suspected cardiac 



                                         etiology including but not limited to chest pain, shortness of breath, 



                                         palpitations, TIA, stroke, or peripheral embolic event 



                                         ,Atrial fibrillation 



                                         History / Clinical:Diabetes, Hypertension, CVA 



                                         Procedures:2D Echo, Colorflow Doppler, Strain, Intravenous Optison 



                                         Contrast 



                                         Race:C 



                                         ------------------------------------ 



                                         SUMMARY: 



                                         ------------------------------------ 



                                         Concentric left ventricular remodeling. 



                                         Estimated EF is 55%. 



                                         LA volume is moderately to severely enlarged. 



                                         Mild mitral regurgitation. 



                                         LV filling pressure is elevated. 



                                         ------------------------------------ 



                                         FINDINGS: 



                                         ------------------------------------ 



                                         LV: LV size is normal. Concentric left ventricular remodeling. 



                                         LVEF is normal. Estimated EF is 55%. Overall wall motion

 



                                         isnormal. 



                                         RV: RV size is normal. RV systolic function is normal. 



                                         LA: LA volume is moderately to severely enlarged. 



                                         RA: RA size is normal. 



                                         AO: Aortic root diameter is normal. 



                                         RONAN: No pericardial effusion. 



                                         AV: No structural AV abnormalities noted. 



                                         MV: No structural MV abnormalities noted. Mild mitral 



                                         regurgitation. 



                                         PV: No structural PV abnormalities noted. Mild pulmonic 



                                         regurgitation. 



                                         TV: No structural TV abnormalities noted. A trace of tricuspid 



                                         regurgitation 



                                         Ross: LV relaxation is impaired. LV filling pressure is elevated. 



                                         Other:Insufficient TR jet to estimate PA systolic pressure. 



                                         ------------------------------------ 



                                         MEASUREMENTS: 



                                         ------------------------------------ 



                                         2D 



                                         Parasternal Long Axis 



                                         LA Ds4.2 



                                         cmLV%fs 28.5 % 



                                         LVOT 2.3 



                                         cmIVSd 1.2 cm 



                                         Ao An2.6 



                                         cmLVPWd1.2 cm 



                                         Ao Rtd 3.9 



                                         cmIndex1.8 



                                         cm/m 



                                         LV Ieif807 g(122-174) 



                                         LVIDd4.6 



                                         cmIndex2.1 



                                         cm/m 



                                         LVM Index 94.5 g/m2 



                                         LVIDs3.3 



                                         cmRWT0.5 



                                         LA Sng Plane 



                                         LA Area 27 cm2(8.8-23.4) LA Vol 103.7 



                                         ml 



                                         Index47.6 ml/m 



                                         LA LngAx 5.4 cm 



                                         DOPPLER 



                                         LVOT For Flow 



                                         LVOT Area4.2 cm2 LVOTmnPG 



                                         1.5 mmHg 



                                         LVOTpkVel 89.5 cm/sLVOT TVI17.1

 



                                         cm 



                                         LVOTpkPG 3.2 mmHgLVOT SV 



                                         71.2 ml 



                                         Mitral Valve 



                                         MV Dec T 168 msec 



                                         Signed 2018 02:23 PM 



                                         Jose Cleary M.D. 









                                        Procedure Note

 

                                        



Interface, Radiology Results In - 2018  2:24 PM CDT



                                   

                        Echocardiography Report

           [address].Name:  FABIÁN MOSQUEDA.ID:    [id number]               

.Date:   2018                Refer.MD:  EMMA ALBA MD     

Exam Time: 11:41:00 AM             Study Type:Routine Echo            

Height:    72in                    BSA:       2.18 m2                 

  Age:  1950,68Y           Sex:       MALE                    

BP:        188/86                  HR:        74 bpm                  

Sonogrphr: JOSEF Cavanaugh. Stat.:Inpatient               

Room:      [address]                 Study Status:Final                 

Echo Event ID:[id number]            

Order ID:  [id number]              

Reason for Study:

Etiology

                                        - Symptoms or conditions potentially related to suspected cardiac

etiology including but not limited to chest pain, shortness of breath,

palpitations, TIA, stroke, or peripheral embolic event

,Atrial fibrillation

History / Clinical:Diabetes, Hypertension, CVA

Procedures:2D Echo, Colorflow Doppler, Strain, Intravenous Optison

Contrast

Race:      C                       

                                        ------------------------------------

SUMMARY:

                                        ------------------------------------

Concentric left ventricular remodeling.

 Estimated EF is 55%.

 LA volume is moderately to severely enlarged.

 Mild mitral regurgitation.

 LV filling pressure is elevated.

                                        ------------------------------------

FINDINGS:

                                        ------------------------------------

LV:       LV size is normal. Concentric left ventricular remodeling.

          LV  EF is normal. Estimated EF is 55%. Overall wall motion

          is  normal.

RV:       RV size is normal. RV systolic function is normal.

LA:       LA volume is moderately to severely enlarged.

RA:       RA size is normal.

AO:       Aortic root diameter is normal.

RONAN:     No pericardial effusion.

AV:       No structural AV abnormalities noted.

MV:       No structural MV abnormalities noted. Mild mitral

          regurgitation. 

PV:       No structural PV abnormalities noted. Mild pulmonic

          regurgitation. 

TV:       No structural TV abnormalities noted. A trace of tricuspid

          regurgitation 

Ross:     LV relaxation is impaired. LV filling pressure is elevated.

Other:    Insufficient TR jet to estimate PA systolic pressure.

                                        ------------------------------------

MEASUREMENTS:

                                        ------------------------------------

                                  2D

Parasternal Long [place]

 LA Ds            4.2 cm            LV%fs           28.5 %   

 LVOT             2.3 cm            IVSd             1.2 cm  

 Ao An            2.6 cm            LVPWd            1.2 cm  

 Ao Rtd           3.9 cm            Index        1.8 cm/m            

 LV Mass          206 g    (122-174)

 LVIDd            4.6 cm            Index        2.1 cm/m            

 LVM Index       94.5 g/m2

 LVIDs            3.3 cm            RWT              0.5     

LA Sng Plane

 LA Area           27 cm2  (8.8-23.4) LA Vol         103.7 ml  

 Index        47.6 ml/m

 LA LngAx         5.4 cm           

                                DOPPLER

LVOT For Flow

 LVOT Area        4.2 cm2           LVOTmnPG         1.5 mmHg

 LVOTpkVel       89.5 cm/s          LVOT TVI        17.1 cm  

 LVOTpkPG         3.2 mmHg          LVOT SV         71.2 ml  

Mitral Valve  

 MV Dec T         168 msec          

 

Signed 2018 02:23 PM

Jose Cleary M.D.









   



                 Performing Organization     Address         City/State/Zipcode     Phone Number

 

   



                      [facility]            [address] [place] [address] 





* XR Chest 2 Vw (2018  8:17 AM CDT)





 



                           Narrative                 Performed At

 

 



                           EXAMINATION:XR CHEST 2 VW      [facility]



                                         CLINICAL HISTORY:Chest painACS suspected 



                                         COMPARISON:2018 



                                         Technique:PA and lateral chest radiographs are obtained. 



                                         IMPRESSION: 



                                         1.There is ill-defined opacity in the medial right lower lobe, and a right 





                                         posterior small-moderate pleural effusion, most likely representing pneumonia. 





                                         Consider chest CT to exclude an underlying mass. The left lung is clear. 



                                         2.There is no pneumothorax. 



                                         3.Heart size is upper limit of normal. 



                                         4.There is no significant skeletal abnormality. 



                                         [id number] 









                                        Procedure Note

 

                                        



 Interface, Radiology Results Incoming - 2018  8:22 AM CDT



EXAMINATION:  XR CHEST 2 VW



CLINICAL HISTORY:  Chest pain  ACS suspected



COMPARISON:  2018



Technique:  PA and lateral chest radiographs are obtained.



IMPRESSION:

                                        1.  There is ill-defined opacity in the medial right lower lobe, and a right posterior

 small-moderate pleural effusion, most likely representing pneumonia. Consider 
chest CT to exclude an underlying mass. The left lung is clear.

                                        2.  There is no pneumothorax.

                                        3.  Heart size is upper limit of normal.

                                        4.  There is no significant skeletal abnormality.







[id number]











   



                 Performing Organization     Address         City/[facility]/Zipcode     Phone Number

 

   



                     [place] [phone] [place] [phone] 





* Urinalysis screen and microscopy, with reflex to culture (2018  1:18 AM 
  CDT)





   



                 Component       Value           Ref Range       Performed At

 

   



                     Specimen site       [place]               [facility] DEPARTMENT OF



                                         PATHOLOGY AND



                                         GENOMIC MEDICINE

 

   



                     Color, UA           Yellow              [facility] DEPARTMENT OF



                                         PATHOLOGY AND



                                         GENOMIC MEDICINE

 

   



                     Appearance, UA      Clear               [facility] DEPARTMENT OF



                                         PATHOLOGY AND



                                         GENOMIC MEDICINE

 

   



                 Specific gravity, UA     1.018           1.001 - 1.035     [facility] DEPARTMENT OF



                                         PATHOLOGY AND



                                         GENOMIC MEDICINE

 

   



                 pH, UA          7.0             5.0 - 8.5       [facility] DEPARTMENT OF



                                         PATHOLOGY AND



                                         GENOMIC MEDICINE

 

   



                 Protein, UA     3+ (A)          Negative        [facility] DEPARTMENT OF



                                         PATHOLOGY AND



                                         GENOMIC MEDICINE

 

   



                 Glucose, UA     3+ (A)          Negative        [facility] DEPARTMENT OF



                                         PATHOLOGY AND



                                         GENOMIC MEDICINE

 

   



                 Ketones, UA     Trace (A)       Negative        [facility] DEPARTMENT OF



                                         PATHOLOGY AND



                                         GENOMIC MEDICINE

 

   



                 Bilirubin, UA     Negative        Negative        [facility] DEPARTMENT OF



                                         PATHOLOGY AND



                                         GENOMIC MEDICINE

 

   



                 Blood, UA       Moderate (A)     Negative        [facility] DEPARTMENT OF



                                         PATHOLOGY AND



                                         GENOMIC MEDICINE

 

   



                 Nitrite, UA     Negative        Negative        [facility] DEPARTMENT OF



                                         PATHOLOGY AND



                                         GENOMIC MEDICINE

 

   



                 Urobilinogen, UA     <2.0            <2.0            [facility] DEPARTMENT OF



                                         PATHOLOGY AND



                                         GENOMIC MEDICINE

 

   



                 Leukocyte esterase, UA     Negative        Negative        [facility] DEPARTMENT OF



                                         PATHOLOGY AND



                                         GENOMIC MEDICINE

 

   



                 Epithelial cells, UA     <1              /HPF            [facility] DEPARTMENT OF



                                         PATHOLOGY AND



                                         GENOMIC MEDICINE

 

   



                 WBC, UA         3 (H)           0 - 1 /HPF      [facility] DEPARTMENT OF



                                         PATHOLOGY AND



                                         GENOMIC MEDICINE

 

   



                 RBC, UA         26 (H)          0 - 5 /HPF      [facility] DEPARTMENT OF



                                         PATHOLOGY AND



                                         GENOMIC MEDICINE

 

   



                 Bacteria, UA     None seen       None seen       [facility] DEPARTMENT OF



                                         PATHOLOGY AND



                                         GENOMIC MEDICINE

 

   



                     Yeast, UA           None seen           [facility] DEPARTMENT OF



                                         PATHOLOGY AND



                                         GENOMIC MEDICINE

 

   



                     Yeast with pseudohyphae,     None seen           [facility] DEPARTMENT OF



                                                   PATHOLOGY AND



                                         GENOMIC MEDICINE

 

   



                 Hyaline casts, UA     1               /LPF            [facility] DEPARTMENT OF



                                         PATHOLOGY AND



                                         GENOMIC MEDICINE













                                         Specimen

 





                                         Urine









   



                 Performing Organization     Address         City/[facility]     Phone Number

 

   



                     [address] [address] 



                                         PATHOLOGY AND GENOMIC   



                                         MEDICINE   





* Urine culture (2018  1:18 AM CDT)





   



                 Component       Value           Ref Range       Performed At

 

   



                     Urine culture       SEE COMMENTComment:      [facility] DEPARTMENT OF



                           Bacteriuria screen negative.      PATHOLOGY AND



                                         GENOMIC MEDICINE









   



                 Performing Organization     Address         City/[facility]/Zipcode     Phone Number

 

   



                     [facility]     [address] [place] [phone] 



                                         PATHOLOGY AND GENOMIC   



                                         MEDICINE   





* Manual differential (2018  5:30 AM CST)



Only the most recent of 2 results within the time period is included.





   



                 Component       Value           Ref Range       Performed At

 

   



                     Manual differential     PERFORMED           [facility] DEPARTMENT OF



                                         PATHOLOGY AND



                                         GENOMIC MEDICINE

 

   



                 Neutrophils     56.0            39.0 - 69.0 %     [facility] DEPARTMENT OF



                                         PATHOLOGY AND



                                         GENOMIC MEDICINE

 

   



                 Lymphocytes     28.0            25.0 - 45.0 %     [facility] DEPARTMENT OF



                                         PATHOLOGY AND



                                         GENOMIC MEDICINE

 

   



                 Monocytes       9.0             0.0 - 10.0 %     [facility] DEPARTMENT OF



                                         PATHOLOGY AND



                                         GENOMIC MEDICINE

 

   



                 Eosinophils     7.0 (H)         0.0 - 5.0 %     [facility] DEPARTMENT OF



                                         PATHOLOGY AND



                                         GENOMIC MEDICINE

 

   



                 Basophils       0.0             0.0 - 1.0 %     [facility] DEPARTMENT OF



                                         PATHOLOGY AND



                                         GENOMIC MEDICINE

 

   



                 Metamyelocytes     0               %               [facility] DEPARTMENT OF



                                         PATHOLOGY AND



                                         GENOMIC MEDICINE

 

   



                 Promyelocytes     0               %               [facility] DEPARTMENT OF



                                         PATHOLOGY AND



                                         GENOMIC MEDICINE

 

   



                     Platelet slide review     Chase adequate        [facility] DEPARTMENT OF



                                         PATHOLOGY AND



                                         GENOMIC MEDICINE

 

   



                     Anisocytosis        Moderate            [facility] DEPARTMENT OF



                                         PATHOLOGY AND



                                         GENOMIC MEDICINE

 

   



                     Polychromasia       Moderate            [facility] DEPARTMENT OF



                                         PATHOLOGY AND



                                         GENOMIC MEDICINE

 

   



                     Ovalocytes          Moderate            [facility] DEPARTMENT OF



                                         PATHOLOGY AND



                                         GENOMIC MEDICINE









   



                 Performing Organization     Address         City/[facility]/Zipcode     Phone Number

 

   



                     [facility] DEPARTMENT      [address] [place] [address] 



                                         PATHOLOGY AND GENOMIC   



                                         MEDICINE   





* XR Picc Chest Portable (2018  4:13 PM CST)





 



                           Narrative                 Performed At

 

 



                           Examination: XR PICC CHEST PORTABLE      [facility]



                                         Clinical history: L03.116 Cellulitis of left lower limb, long term antibiotics 





                                         Comparison: None 



                                         Impression: 



                                         1. PICC line tip is in the SVC. 



                                         2. The cardiac silhouette is mildly enlarged. Vasculature is borderline without

 



                                         maia congestion. 



                                         3. There is no confluent infiltrate or effusion. 



                                          



                                          



                                         [facility]-[id number] 









                                        Procedure Note

 

                                        



Hm Interface, Radiology Results Incoming - 2018  4:18 PM CST



Examination: XR PICC CHEST PORTABLE



Clinical history: L03.116 Cellulitis of left lower limb, long term antibiotics



Comparison: None



Impression:

 

                                        1. PICC line tip is in the SVC.

                                        2. The cardiac silhouette is mildly enlarged. Vasculature is borderline without 

maia congestion.

                                        3. There is no confluent infiltrate or effusion.

  





   





[facility]-[id number]











   



                 Performing Organization     Address         City/State/Zipcode     Phone Number

 

   



                      [facility]          [address] [place] [address] 





* PICC INSERTION (2018  3:49 PM CST)





 



                           Narrative                 Performed At

 

 



                                         [facility] [address]:49 PM 



                                         PICC insertion 



                                         Date/Time: 2018 3:46 PM 



                                         Performed by: RAFAELA WEBB 



                                         Authorized by: JARED NEWMAN 



                                         Consent: 



                                         Consent obtained:Verbal 



                                         Consent given by:Patient 



                                         Risks discussed: arterial puncture, incorrect placement, nerve damage, 



                                         bleeding, infection, superficial thrombus and deep vein thrombus 



                                         Alternatives discussed:Alternative treatment 



                                         Universal protocol: 



                                         Procedure explained and questions answered to patient or proxy's 



                                         satisfaction: yes 



                                         Relevant documents present and verified: yes 



                                         Test results available and properly labeled: yes 



                                         Imaging studies available: yes 



                                         Required blood products, implants, devices, and special equipment 



                                         available: yes 



                                         Site/side marked: yes 



                                         Immediately prior to procedure, a time out was called: yes 



                                         Patient identity confirmed:Verbally with patient, arm band, provided 



                                         demographic data and hospital-assigned identification number 



                                         Pre-procedure details: 



                                         Hand hygiene: Hand hygiene performed prior to insertion 



                                         Sterile barrier technique: All elements of maximal sterile technique 



                                         followed 



                                         Skin preparation:2% chlorhexidine 



                                         Skin preparation agent: Skin preparation agent completely dried prior to 



                                         procedure 



                                         Anesthesia (see MAR for exact dosages): 



                                         Anesthesia method:Local infiltration 



                                         Local anesthetic:Lidocaine 1% w/o epi 



                                         Route of administration:Subcutaneous 



                                         PICC Line Placement Details (Will create an LDA): 



                                         Patient position:Flat 



                                         Indication:Known long term IV therapy 



                                         Location:Right basilic 



                                         Device Type:Non-valved 



                                         Catheter size:5 Fr 



                                         PICC Characteristics: 



                                         Catheter Brand:BIOFLO POWER PICC 



                                         External Catheter Length (cm):0 



                                         Internal Catheter Length (cm):40 



                                         Total Catheter Length (cm):40 



                                         Catheter Lot Number:[id number] 



                                         Catheter Expiration Date:2019 



                                         Procedure Details: 



                                         Landmarks identified: yes 



                                         Ultrasound guidance: yes 



                                         Sterile ultrasound techniques: Sterile gel and sterile probe covers were 



                                         used 



                                         Number of attempts:1 



                                         Number of PICC kits used during procedure:1 



                                         Purpose of procedure:PICC Placement 



                                         Successful PICC Placement: Yes 



                                         Patency/Placement:Flushes without difficulty, flushed with 10 mL 



                                         normal saline, positive blood return, x-ray placement verified and 



                                         injection cap placed 



                                         PICC placed utlizing ultrasound-guided Modified Seldinger Technique: Yes 



                                         Dressing/Securement:Antimicrobial dressing applied and catheter 



                                         securement device 



                                         Blood Loss Amount:Less than 20 mL 



                                         Post-Procedure Details: 



                                         Post-procedure:Dressing applied 



                                         Tip placement confirmed by chest x-ray: Yes 



                                         Patient tolerance of procedure:Tolerated well, no immediate 



                                         complications 





* Vancomycin level, trough (2018  9:38 AM CST)





   



                 Component       Value           Ref Range       Performed At

 

   



                 Vancomycin, trough     18.9            10.0 - 20.0 ug/mL     [facility] DEPARTMENT OF



                           Comment:                  PATHOLOGY AND



                           Therapeutic Ranges:       GENOMIC MEDICINE



                                         Peak 30.0 -  



                                         40.0 ug/mL  



                                         Eiqpvf07.0 -  



                                         20.0 ug/mL  













                                         Specimen

 





                                         Serum









   



                 Performing Organization     Address         City/[facility]     Phone Number

 

   



                     [address] [address] 



                                         PATHOLOGY AND GENOMIC   



                                         MEDICINE   





* CBC hemogram (2018  7:00 AM CST)





   



                 Component       Value           Ref Range       Performed At

 

   



                 WBC             4.82            4.50 - 11.00 k/uL     [facility] DEPARTMENT OF



                                         PATHOLOGY AND



                                         GENOMIC MEDICINE

 

   



                 RBC             3.25 (L)        4.40 - 6.00 m/uL     [facility] DEPARTMENT OF



                                         PATHOLOGY AND



                                         GENOMIC MEDICINE

 

   



                 HGB             8.7 (L)         14.0 - 18.0 g/dL     [facility] DEPARTMENT OF



                                         PATHOLOGY AND



                                         GENOMIC MEDICINE

 

   



                 HCT             27.5 (L)        41.0 - 51.0 %     [facility] DEPARTMENT OF



                                         PATHOLOGY AND



                                         GENOMIC MEDICINE

 

   



                 MCV             84.6            82.0 - 100.0 fL     [facility] DEPARTMENT OF



                                         PATHOLOGY AND



                                         GENOMIC MEDICINE

 

   



                 MCH             26.8 (L)        27.0 - 34.0 pg     [facility] DEPARTMENT OF



                                         PATHOLOGY AND



                                         GENOMIC MEDICINE

 

   



                 MCHC            31.6            31.0 - 37.0 g/dL     [facility] DEPARTMENT OF



                                         PATHOLOGY AND



                                         GENOMIC MEDICINE

 

   



                 RDW - SD        46.8            37.0 - 55.0 fL     [facility] DEPARTMENT OF



                                         PATHOLOGY AND



                                         GENOMIC MEDICINE

 

   



                 MPV             9.8             8.8 - 13.2 fL     [facility] DEPARTMENT OF



                                         PATHOLOGY AND



                                         GENOMIC MEDICINE

 

   



                 Platelet count     262             150 - 400 k/uL     [facility] DEPARTMENT OF



                                         PATHOLOGY AND



                                         GENOMIC MEDICINE

 

   



                 Nucleated RBC     0.00            /100 WBC        [facility] DEPARTMENT OF



                                         PATHOLOGY AND



                                         GENOMIC MEDICINE













                                         Specimen

 





                                         Blood









   



                 Performing Organization     Address         City/[facility]     Phone Number

 

   



                     [address] [address] 



                                         PATHOLOGY AND GENOMIC   



                                         MEDICINE   





* XR Foot 3+ Vw Left (01/15/2018  7:52 PM CST)





 



                           Narrative                 Performed At

 

 



                           EXAMINATION:XR FOOT 3VW LEFT     [facility]



                                         CLINICAL HISTORY:OSTEOMYELITISFOOT, charcot vs. osteomyelitis 



                                         COMPARISON:MRI 2018 



                                         IMPRESSION: 



                                         3 views of the left foot were acquired. 



                                         Diffuse osteopenia and there are extensive bony deformities and soft tissue 



                                         swelling is noted predominating about the hindfoot and midfoot. Status post 



                                         great toe amputation. Marked osteolysis and destructive changes are seen, most 





                                         severely about the 



                                         tarsometatarsal articulation but essentially involving all tarsal and metatarsal

 



                                         bones. There is relative sparing of the phalanges and the dorsal 



                                         calcaneous.The process is mostly lytic/destructive with little to no 



                                         sclerotic changes seen. Overall, 



                                         acute osteomyelitis is likely the predominant process. 



                                         No definite soft tissue gas is seen. 



                                         Diffuse vascular calcifications. 



                                         [facility]-[id number] 









                                        Procedure Note

 

                                        



 Interface, Radiology Results Incoming - 01/15/2018  8:21 PM CST



EXAMINATION:  XR FOOT 3  VW LEFT



CLINICAL HISTORY:  OSTEOMYELITIS  FOOT, charcot vs. osteomyelitis



COMPARISON:  MRI 2018



IMPRESSION:



                                        3 views of the left foot were acquired.



Diffuse osteopenia and there are extensive bony deformities and soft tissue 
swelling is noted predominating about the hindfoot and midfoot. Status post 
great toe amputation. Marked osteolysis and destructive changes are seen, most 
severely about the 

tarsometatarsal articulation but essentially involving all tarsal and metatarsal
bones. There is relative sparing of the phalanges and the dorsal calcaneous.  
The process is mostly lytic/destructive with little to no sclerotic changes 
seen. Overall, 

acute osteomyelitis is likely the predominant process.



No definite soft tissue gas is seen.



Diffuse vascular calcifications.











[facility]-[id number]











   



                 Performing Organization     Address         City/[facility]/Zipcode     Phone Number

 

   



                     [facility]          [address] [place] [address] 





* Fungus smear (01/15/2018  6:37 PM CST)





   



                 Component       Value           Ref Range       Performed At

 

   



                     Fungus smear        No fungi observed.      [facility] DEPARTMENT OF



                           Comment:                  PATHOLOGY AND



                           Specimen Information      GENOMIC MEDICINE



                                         Specimen Source: Wound  



                                         Specimen Site: Foot  













                                         Specimen

 





                                         Wound - Foot









   



                 Performing Organization     Address         City/[facility]/Zipcode     Phone Number

 

   



                     [facility] DEPARTMENT OF     [address] [place] [address] 



                                         PATHOLOGY AND GENOMIC   



                                         MEDICINE   





* AFB culture (01/15/2018  6:37 PM CST)





   



                 Component       Value           Ref Range       Performed At

 

   



                     AFB culture isolate     No growth after 6 weeks of      [facility] DEPARTMENT OF



                           incubation.               PATHOLOGY AND



                           Comment:                  GENOMIC MEDICINE



                                         Specimen Information  



                                         Specimen Source: Wound  



                                         Specimen Site: Foot  













                                         Specimen

 





                                         Wound - Foot









   



                 Performing Organization     Address         City/[facility]/Zipcode     Phone Number

 

   



                     [facility] DEPARTMENT OF     [address] [place] [phone] 



                                         PATHOLOGY AND GENOMIC   



                                         MEDICINE   





* Aerobic culture (01/15/2018  6:37 PM CST)



Only the most recent of 2 results within the time period is included.





   



                 Component       Value           Ref Range       Performed At

 

   



                     Aerobic culture isolate     Staphylococcus, coagulase      [facility] DEPARTMENT OF



                           negative                  PATHOLOGY AND



                           Recovered in Broth only:      GENOMIC MEDICINE



                                         susceptibility to follow  



                                         (A)  



                                         Comment:  



                                         Specimen Information  



                                         Specimen Source: Wound  



                                         Specimen Site: Foot  













                                         Specimen

 





                                         Wound - Foot









                    Antibiotic          Method              Susceptibility



                                         Organism   

 

                    Ampicillin          NATALIE                 



 mcg/mL: Resistant



                                         Staphylococcus coagulase   



                                         negative   

 

                    Clindamycin         NATALIE                 



>2 mcg/mL: Resistant



                                         Staphylococcus coagulase   



                                         negative   

 

                    Erythromycin        NATALIE                 



>4 mcg/mL: Resistant



                                         Staphylococcus coagulase   



                                         negative   

 

                    Levofloxacin        NATALIE                 



>4 mcg/mL: Resistant



                                         Staphylococcus coagulase   



                                         negative   

 

                    Linezolid           NATALIE                 



2 mcg/mL: Susceptible



                                         Staphylococcus coagulase   



                                         negative   

 

                    Minocycline         NATALIE                 



2 mcg/mL: Susceptible



                                         Staphylococcus coagulase   



                                         negative   

 

                    Oxacillin           NATALIE                 



>1 mcg/mL: Resistant



                                         Staphylococcus coagulase   



                                         negative   

 

                    Penicillin G        NATALIE                 



>1 mcg/mL: Resistant



                                         Staphylococcus coagulase   



                                         negative   

 

                    Rifampin            NATALIE                 



<=0.5 mcg/mL: Susceptible



                                         Staphylococcus coagulase   



                                         negative   

 

                    Tetracycline        NATALIE                 



2 mcg/mL: Susceptible



                                         Staphylococcus coagulase   



                                         negative   

 

                    Vancomycin          NATALIE                 



2 mcg/mL: Susceptible



                                         Staphylococcus coagulase   



                                         negative   

 

                    Trimethoprim/Sulfamethoxazole    NATALIE                 



<=0.5/9.5 mcg/mL: Susceptible



                                         Staphylococcus coagulase   



                                         negative   









   



                 Performing Organization     Address         City/[facility]     Phone Number

 

   



                     [facility] DEPARTMENT OF     [address] [place], [place] [id number] 



                                         PATHOLOGY AND GENOMIC   



                                         MEDICINE   





* Gram stain (01/15/2018  6:37 PM CST)



Only the most recent of 2 results within the time period is included.





   



                 Component       Value           Ref Range       Performed At

 

   



                     Gram stain isolate     Rare WBC's          [facility] DEPARTMENT OF



                           No organisms seen         PATHOLOGY AND



                           Comment:                  GENOMIC MEDICINE



                                         Specimen Information  



                                         Specimen Source: Wound  



                                         Specimen Site: Foot  













                                         Specimen

 





                                         Wound - Foot









   



                 Performing Organization     Address         City/State/Zipcode     Phone Number

 

   



                     [facility] DEPARTMENT OF     [address] [place] [id number] 



                                         PATHOLOGY AND GENOMIC   



                                         MEDICINE   





* AFB stain (01/15/2018  6:37 PM CST)





   



                 Component       Value           Ref Range       Performed At

 

   



                     AFB stain           No acid fast bacilli (AFB)      [facility] DEPARTMENT OF



                           seen.                     PATHOLOGY AND



                           Comment:                  GENOMIC MEDICINE



                                         Specimen Information  



                                         Specimen Source: Wound  



                                         Specimen Site: Foot  













                                         Specimen

 





                                         Wound - Foot









   



                 Performing Organization     Address         City/[facility]/Zipcode     Phone Number

 

   



                     [facility] DEPARTMENT OF     [address] [place] [address] 



                                         PATHOLOGY AND GENOMIC   



                                         MEDICINE   





* Fungus culture (01/15/2018  6:37 PM CST)





   



                 Component       Value           Ref Range       Performed At

 

   



                     Fungus culture isolate     No growth after 4 weeks of      [facility] DEPARTMENT OF



                           incubation.               PATHOLOGY AND



                           Comment:                  GENOMIC MEDICINE



                                         Specimen Information  



                                         Specimen Source: Wound  



                                         Specimen Site: Foot  













                                         Specimen

 





                                         Wound - Foot









   



                 Performing Organization     Address         City/[facility]/Zipcode     Phone Number

 

   



                     [facility] DEPARTMENT OF     [address] [place] [address] 



                                         PATHOLOGY AND GENOMIC   



                                         MEDICINE   





* Anaerobic culture (01/15/2018  6:37 PM CST)



Only the most recent of 2 results within the time period is included.





   



                 Component       Value           Ref Range       Performed At

 

   



                     Anaerobic culture isolate     No anaerobic organisms      [facility] DEPARTMENT OF



                           isolated.                 PATHOLOGY AND



                           Comment:                  GENOMIC MEDICINE



                                         Specimen Information  



                                         Specimen Source: Wound  



                                         Specimen Site: Foot  













                                         Specimen

 





                                         Wound - Foot









   



                 Performing Organization     Address         City/[facility]/Zipcode     Phone Number

 

   



                     [facility] DEPARTMENT OF     [address] [place] [address] 



                                         PATHOLOGY AND GENOMIC   



                                         MEDICINE   





* MRI Lower Extremity W Wo Contrast Left (2018  6:35 PM CST)





 



                           Narrative                 Performed At

 

 



                           This result has an attachment that is not available.      [facility]



                                         EXAMINATION:MRI LOWER EXTREMITY W WO CONTRAST LEFT 



                                         CLINICAL HISTORY:r o osteomyelitis 



                                         COMPARISON:None. 



                                         FINDINGS: 



                                         There is extensive fatty infiltration of the marrow spaces involving the 



                                         hindfoot and the midfoot. 



                                         The findings are consistent with extensive osteomyelitis involving the tarsal 



                                         bones as well as the anterior half of the calcaneus as well as the second and 



                                         third metatarsals. The first metatarsal has been resected. 



                                         There is a large abscess extending from the medial tarsal bones into the medial

 



                                         soft tissues, measuring approximately 5 cm in a coronal dimension and extending

 



                                         over 7 cm in an AP dimension, and extending over 3 cm in height and extending to

 



                                         the medial 



                                         skin surface. In addition, a small abscess is noted between the third and fourth

 



                                         metatarsal phalangeal joints. 



                                         IMPRESSION: 



                                         Extensive osteomyelitis involving the hindfoot, midfoot, and metatarsals. 



                                         Abscesses as noted above. 



                                         [facility]-[id number] 









                                        Procedure Note

 

                                        



 Interface, Radiology Results Incoming - 2018  7:11 PM CST



EXAMINATION:  MRI LOWER EXTREMITY W WO CONTRAST LEFT



CLINICAL HISTORY:  r o osteomyelitis



COMPARISON:  None.





FINDINGS:



There is extensive fatty infiltration of the marrow spaces involving the 
hindfoot and the midfoot.



The findings are consistent with extensive osteomyelitis involving the tarsal 
bones as well as the anterior half of the calcaneus as well as the second and 
third metatarsals. The first metatarsal has been resected.



There is a large abscess extending from the medial tarsal bones into the medial 
soft tissues, measuring approximately 5 cm in a coronal dimension and extending 
over 7 cm in an AP dimension, and extending over 3 cm in height and extending to
the medial 

skin surface. In addition, a small abscess is noted between the third and fourth
metatarsal phalangeal joints.





IMPRESSION:



Extensive osteomyelitis involving the hindfoot, midfoot, and metatarsals. 
Abscesses as noted above.







[facility]-[id number]











   



                 Performing Organization     Address         City/[facility]/Zipcode     Phone Number

 

   



                      [facility]          [address] [place] [address] 





* Lactic acid level, SEPSIS - Now and repeat 2x every 3 hours (2018  5:10 
  AM CST)



Only the most recent of 3 results within the time period is included.





   



                 Component       Value           Ref Range       Performed At

 

   



                 Lactic acid     1.9             0.5 - 2.2 mmol/L     [facility] DEPARTMENT OF



                                         PATHOLOGY AND



                                         GENOMIC MEDICINE













                                         Specimen

 





                                         Blood









   



                 Performing Organization     Address         City/[facility]     Phone Number

 

   



                     [facility] DEPARTMENT OF     [address] [place] [address] 



                                         PATHOLOGY AND GENOMIC   



                                         MEDICINE   





* PV Duplex Arterial Lower Extremity (2018 10:45 PM CST)





 



                           Narrative                 Performed At

 

 



                                [facility]





                                         Vascular Ultrasound Laboratory 



                                         Lower Extremity Arterial Duplex Report 



                                          [address] [place] [address].Name:Deedee MOSQUEDA.ID:[id number] 





                                         .Date: 2018 Refer.MD:DENIS MOLINA MD 



                                         Exam Time: 11:08:00 PM Study Type:LE 



                                         Arterial 



                                         DOBAge:1950,67Y Sex: 



                                         MALE 



                                         Sonogrphr: Hubert Moore. 



                                         Stat.:Inpatient 



                                         Room:[place] [address] TapeVol: 



                                         RF, 



                                         CPT - 4: 17996 Echo Event 



                                         ID:[id number] 



                                         Order ID:[id number] 



                                         Reason for Study:Non-healing wound of the left lateral foot with 



                                         associated redness, swelling and pain. History of PAD s/p left toe 



                                         amputation and right 2nd toe amputation, CVA (2017 -per patient), DM, 



                                         HTN. 



                                         ------------------------------------ 



                                         SUMMARY: 



                                         ------------------------------------ 



                                         DUPLEX SCAN OBSERVATIONS: 



                                         LEFT: The common femoral and profunda femoris artery is visualized 



                                         with hyperechoic arterial walls, colorflow and biphasic arterial 



                                         Doppler signals present. The femoral artery is calcified throughout 



                                         its length with colorflow and biphasic arterial Doppler signals 



                                         present. The popliteal artery is visualized with hyoerechoic arterial 



                                         walls and hard plaque distally. Colorflow disturbance and elevated 



                                         velocity is noted at the distal popliteal artery. The posterior tibial 



                                         artery is visualized with hyoerechoic arterial walls, scattered 



                                         calcified plaque, colorflow and monophasic arterial Doppler signals. 



                                         Multiple collaterals are noted at the distal posterior tibial artery. 



                                         The anterior tibial artery is calcified with monophasic arterial 



                                         Doppler signals noted distally. The peroneal artery could not be 



                                         visualized. 



                                         ANKLE/BRACHIAL INDEX: 



                                          RIGHTLEFT 



                                          Brachial Artery Pressure 160 [id number] mmHg 



                                          DP>255 mmHg>255mmHg 



                                          PT>255 okXn095 mmHg 



                                          MALDONADO [id number] 



                                          MALDONADO PTNon-compressible 0.84 



                                         TOE/BRACHIAL INDEX: 



                                          Great Ivm523 mmHg78 mmHg 



                                          TBI0.57 0.48 



                                          



                                         PRELIMINARY FINDINGS: 



                                         1. Patency of the left common femoral artery, profunda femoris, 



                                         femoral artery and anterior tibial artery. 



                                         2. 50%-75% stenosis of the left distal popliteal artery. 



                                         3. Monophasic arterial Doppler signals of the left posterior tibial 



                                         artery with collaterals noted. 



                                         4. Unable to calculate the right ankle/brachial index due to 



                                         non-compressible arteries. 



                                         5. Unable to calculate the left dorsalis pedis ankle/brachial index 



                                         due to non-compressible arteries. 



                                         6. The right toe/brachial index falls into the mild category and the 



                                         left toe/brachial index (using the 2nd digit) falls into the mild 



                                         category. 



                                         PHYSICIAN INTERPRETATION: 



                                         Arterial duplex examination ofleft lower extremity demonstrates 



                                         50%-75% stenosis of the left distal popliteal artery. 



                                         Calcifications. The right toe/brachial index falls into the mild 



                                         category and the left toe/brachial index (using the 2nd digit) falls 



                                         into the mild category. 



                                         ------------------------------------ 



                                         MEASUREMENTS: 



                                         ------------------------------------ 



                                         DOPPLER 



                                         Left CFA Dist 



                                         CFA Dist  cm/s 



                                         Left Profunda 



                                         Profunda  cm/s 



                                         Left SFA Prox 



                                         SFA Prox  cm/s 



                                         Left SFA Mid 



                                         SFA Mid [id number] cm/s 



                                         Left SFA Dist 



                                         SFA Dist  cm/s 



                                         Left Pop Prox 



                                         Pop Prox  cm/s 



                                         Left Pop Mid 



                                         Pop Mid PSV 93.6 cm/s 



                                         Left Pop Dist 



                                         Pop Dist  cm/s 



                                         Left Pop Dist 1 



                                         Pop Dist 1  cm/s 



                                         Left PTA Prox 



                                         PTA Prox PSV69.9 cm/s 



                                         Left PTA Mid 



                                         PTA Mid PSV 82.8 cm/s 



                                         Left PTA Distal 



                                         PTA Distal PSV49.5 cm/s 



                                         Left REAGAN Prox 



                                         REAGAN Prox  cm/s 



                                         Left REAGAN Mid 



                                         REAGAN Mid GIL401 cm/s 



                                         Left REAGAN Distal 



                                         REAGAN Distal PSV73.4 cm/s 



                                         Signed 2018 08:54 AM 



                                         Sonia Lewis MD, RPVI 









                                        Procedure Note

 

                                        



Interface, Radiology Results In - 2018  8:54 AM [facility]



                                   

                    Vascular Ultrasound Laboratory

                Lower Extremity Arterial Duplex Report

           [address] [place] [address]

Pat.Name:  FABIÁN MOSQUEDA          Pat.ID:    [id number]               

.Date:   2018               Refer.MD:  DENIS MOLINA MD        

Exam Time: 11:08:00 PM             Study Type:LE Arterial             

  Age:  1950,67Y           Sex:       MALE                    

Sonogrphr: La Nena Escamilla RVT  Pat. Stat.:Inpatient               

Room:      [place] [address]                 Tape  Vol: ,                     

CPT - 4:   [address]                   Echo Event ID:[id number]            

Order ID:  [id number]              

Reason for Study:Non-healing wound of the left lateral foot with

associated redness, swelling and pain. History of PAD s/p left toe

amputation and right 2nd toe amputation, CVA (2017 -per patient), DM,

HTN.

                                        ------------------------------------

SUMMARY:

                                        ------------------------------------

DUPLEX SCAN OBSERVATIONS:

 LEFT: The common femoral and profunda femoris artery is visualized

with hyperechoic arterial walls, colorflow and biphasic arterial

Doppler signals present. The femoral artery is calcified throughout

its length with colorflow and biphasic arterial Doppler signals

present. The popliteal artery is visualized with hyoerechoic arterial

walls and hard plaque distally. Colorflow disturbance and elevated

velocity is noted at the distal popliteal artery. The posterior tibial

artery is visualized with hyoerechoic arterial walls, scattered

calcified plaque, colorflow and monophasic arterial Doppler signals.

Multiple collaterals are noted at the distal posterior tibial artery.

The anterior tibial artery is calcified with monophasic arterial

Doppler signals noted distally. The peroneal artery could not be

visualized. 

 

 ANKLE/BRACHIAL INDEX:

     RIGHT  LEFT

   Brachial Artery Pressure 160 mmHg  174 mmHg

   DP  >255 mmHg  >255mmHg

   PT  >255 mmHg  146 mmHg

 

   MALDONADO DP  Non-compressible  Non-compressible

   MALDONADO PT  Non-compressible 0.84            

 

 TOE/BRACHIAL INDEX:

   Great Toe  100 mmHg  78 mmHg

   TBI  0.57   0.48

   

 

 PRELIMINARY FINDINGS:

 1. Patency of the left common femoral artery, profunda femoris,

femoral artery and anterior tibial artery. 

 2. 50%-75% stenosis of the left distal popliteal artery. 

 3. Monophasic arterial Doppler signals of the left posterior tibial

artery with collaterals noted. 

 4. Unable to calculate the right ankle/brachial index due to

non-compressible arteries. 

 5. Unable to calculate the left dorsalis pedis ankle/brachial index

due to non-compressible arteries. 

 6. The right toe/brachial index falls into the mild category and the

left toe/brachial index (using the 2nd digit) falls into the mild

category. 

 

 PHYSICIAN INTERPRETATION:

 Arterial duplex examination of  left lower extremity demonstrates 

                                        50%-75% stenosis of the left distal popliteal artery. 

  Calcifications. The right toe/brachial index falls into the mild

category and the left toe/brachial index (using the 2nd digit) falls

into the mild category. 

 

 

                                        ------------------------------------

MEASUREMENTS:

                                        ------------------------------------

                                DOPPLER

Left CFA Dist  

 CFA Dist PSV     139 cm/s         

Left Profunda  

 Profunda PSV     103 cm/s         

Left SFA Prox  

 SFA Prox PSV     126 cm/s         

Left SFA Mid  

 SFA Mid PSV      149 cm/s         

Left SFA Dist  

 SFA Dist PSV     152 cm/s         

Left Pop Prox  

 Pop Prox PSV     152 cm/s         

Left Pop Mid  

 Pop Mid PSV     93.6 cm/s         

Left Pop Dist  

 Pop Dist PSV     119 cm/s         

Left Pop Dist 1  

 Pop Dist 1 PSV   314 cm/s         

Left PTA Prox  

 PTA Prox PSV    69.9 cm/s         

Left PTA Mid  

 PTA Mid PSV     82.8 cm/s         

Left PTA Distal  

 PTA Distal PSV  49.5 cm/s         

Left REAGAN Prox  

 REAGAN Prox PSV     100 cm/s         

Left REAGAN Mid  

 REAGAN Mid PSV      113 cm/s         

Left REAGAN Distal  

 REAGAN Distal PSV  73.4 cm/s          

 

Signed 2018 08:54 AM

Sonia Lewis MD, RPVI









   



                 Performing Organization     Address         City/[facility]/Zipcode     Phone Number

 

   



                     [facility]            [address] [place] [address] 





* Troponin (2018  5:33 PM CST)





   



                 Component       Value           Ref Range       Performed At

 

   



                 Troponin        <0.30           0.00 - 0.30 ng/mL     [facility] DEPARTMENT OF



                           Comment:                  PATHOLOGY AND



                           0.30 - 1.49               GENOMIC MEDICINE



                                         ng/mlMay  



                                         indicate increased risk of  



                                         acute  



                                           



                                          coronary  



                                         syndrome.  



                                           



                                           



                                         >=1.5  



                                         ng/ml  



                                         Consistent with acute  



                                         myocardial  



                                           



                                          infarction.  



                                           



                                           



                                           



                                           



                                           



                                         The diagnostic value of a  



                                         single normal or  



                                         non-diagnostic  



                                         result is  



                                         questionable.Serial  



                                         samples at 2-6 hour intervals  



                                         are required to rule out acute  



                                         myocardial injury.  













                                         Specimen

 





                                         Plasma specimen









   



                 Performing Organization     Address         City[facility]     Phone Number

 

   



                     [facility] DEPARTMENT OF     [address] [place] [address] 



                                         PATHOLOGY AND GENOMIC   



                                         MEDICINE   





* B natriuretic peptide (2018  5:33 PM CST)





   



                 Component       Value           Ref Range       Performed At

 

   



                 BNP             264 (H)         0 - 100 pg/mL     [facility] DEPARTMENT OF



                                         PATHOLOGY AND



                                         GENOMIC MEDICINE













                                         Specimen

 





                                         Blood









   



                 Performing Organization     Address         City/[facility]/Zipcode     Phone Number

 

   



                     [facility] DEPARTMENT OF     [address] [place] [id number] 



                                         PATHOLOGY AND GENOMIC   



                                         MEDICINE   





after 2017



Insurance







     



            Payer      Benefit     Subscriber ID     Type       Phone      Address



                                         Plan /    



                                         Group    

 

     



              MEDICARE     MEDICARE     xxxxxxxxxx     Medicare      [place], [place]



                                         PART A AND    



                                         B    

 

     



                 MEDICAID        MEDICAID        xxxxxxxxx       Medicaid  









     



            Guarantor Name     Account     Relation to     Date of     Phone      Billing Address



                     Type                Patient             Birth  

 

     



            Fabián Mosqueda     Personal/F     Self       1950     [phone]     [address] [facility]               ([place])              [place] [address]







Advance Directives





Patient has advance care planning documents on file. For more information, tammie villalobos contact:



Alfredo Salmeron



[address] [place] [address]

## 2018-11-27 NOTE — DIAGNOSTIC IMAGING REPORT
Bilateral feet - 3 view(s) each



HISTORY:  Pain

COMPARISON: None available.

     

FINDINGS:

Right:

Amputation of the second digit at the level of the metatarsophalangeal joint.

Amputation of the fourth and fifth digits at the level of the mid metatarsals.

Subluxation of the third metatarsophalangeal joint.

No evidence of acute fracture or dislocation.

No periosteal reaction or erosion.

Vascular calcification.

Dorsal calcaneal osteophyte.



Left:

Amputation of the fifth digit at the level of the proximal metatarsal.

Thickening of the right fourth metatarsal as well as chronic appearing erosive

changes of the third metatarsal with destruction of the third and fourth

metatarsophalangeal joints.

Description of the tarsometatarsal joints and tarsal bones, representing

Charcot arthropathy. 

Periosteal reaction of the visualized distal fibula and to lesser extent tibia.

Diffuse soft tissue swelling of the right foot.





IMPRESSION: 

1.  Chronic changes of the bilateral feet as detailed above.

2.  No definite evidence of acute displaced fracture or dislocation.

3.  Thickening of the right fourth metatarsal as well as chronic appearing

erosive changes of the third metatarsal with destruction of the third and

fourth metatarsophalangeal joints.

4.  Destruction of the right tarsometatarsal joints and tarsal bones,

representing Charcot arthropathy. Underlying erosive changes or osteomyelitis

cannot be entirely excluded. There is also periosteal reaction of the

visualized distal left fibula and to lesser extent tibia.



Signed by: Dr. Daniele Givens MD on 11/27/2018 8:21 PM

## 2018-11-27 NOTE — XMS REPORT
Patient Summary Document

                             Created on: 2018



ALANA MOSQUEDA

External Reference #: [id number]

: 1950

Sex: Male



Demographics







                          Address                   [address] [place]  [address]

 

                          Home Phone                [phone]

 

                          Preferred Language        Unknown

 

                          Marital Status            Unknown

 

                          Religion Affiliation     Unknown

 

                          Race                      Unknown

 

                          Ethnic Group              Unknown





Author







                          Author                    [facility]

 

                          [facility]

 

                          Address                   Unknown

 

                          Phone                     Unavailable







Care Team Providers







                    Care Team Member Name    Role                Phone

 

                          Unavailable               Unavailable







Problems

This patient has no known problems.



Allergies, Adverse Reactions, Alerts

This patient has no known allergies or adverse reactions.



Medications

This patient has no known medications.

## 2018-11-28 VITALS — SYSTOLIC BLOOD PRESSURE: 155 MMHG | DIASTOLIC BLOOD PRESSURE: 70 MMHG

## 2018-11-28 VITALS — DIASTOLIC BLOOD PRESSURE: 78 MMHG | SYSTOLIC BLOOD PRESSURE: 142 MMHG

## 2018-11-28 VITALS — DIASTOLIC BLOOD PRESSURE: 74 MMHG | SYSTOLIC BLOOD PRESSURE: 163 MMHG

## 2018-11-28 VITALS — DIASTOLIC BLOOD PRESSURE: 63 MMHG | SYSTOLIC BLOOD PRESSURE: 142 MMHG

## 2018-11-28 VITALS — SYSTOLIC BLOOD PRESSURE: 138 MMHG | DIASTOLIC BLOOD PRESSURE: 66 MMHG

## 2018-11-28 VITALS — DIASTOLIC BLOOD PRESSURE: 63 MMHG | SYSTOLIC BLOOD PRESSURE: 136 MMHG

## 2018-11-28 RX ADMIN — HYDRALAZINE HYDROCHLORIDE SCH MG: 25 TABLET ORAL at 11:00

## 2018-11-28 RX ADMIN — ACETAMINOPHEN PRN MG: 325 SOLUTION ORAL at 21:00

## 2018-11-28 RX ADMIN — INSULIN LISPRO SCH UNIT: 100 INJECTION, SOLUTION INTRAVENOUS; SUBCUTANEOUS at 12:00

## 2018-11-28 RX ADMIN — ASPIRIN 81 MG CHEWABLE TABLET SCH MG: 81 TABLET CHEWABLE at 13:39

## 2018-11-28 RX ADMIN — INSULIN LISPRO SCH UNIT: 100 INJECTION, SOLUTION INTRAVENOUS; SUBCUTANEOUS at 21:00

## 2018-11-28 RX ADMIN — SIMVASTATIN SCH MG: 20 TABLET, FILM COATED ORAL at 20:40

## 2018-11-28 RX ADMIN — PANTOPRAZOLE SODIUM SCH MG: 40 TABLET, DELAYED RELEASE ORAL at 09:00

## 2018-11-28 RX ADMIN — SODIUM CHLORIDE SCH MLS/HR: 9 INJECTION, SOLUTION INTRAVENOUS at 12:00

## 2018-11-28 RX ADMIN — Medication SCH MG: at 23:00

## 2018-11-28 RX ADMIN — INSULIN LISPRO SCH UNIT: 100 INJECTION, SOLUTION INTRAVENOUS; SUBCUTANEOUS at 17:00

## 2018-11-28 RX ADMIN — TAZOBACTAM SODIUM AND PIPERACILLIN SODIUM SCH MLS/HR: 375; 3 INJECTION, SOLUTION INTRAVENOUS at 06:00

## 2018-11-28 RX ADMIN — METOPROLOL SUCCINATE SCH MG: 50 TABLET, EXTENDED RELEASE ORAL at 09:00

## 2018-11-28 RX ADMIN — INSULIN LISPRO SCH UNIT: 100 INJECTION, SOLUTION INTRAVENOUS; SUBCUTANEOUS at 08:00

## 2018-11-28 RX ADMIN — SODIUM CHLORIDE SCH GM: 9 INJECTION, SOLUTION INTRAVENOUS at 22:45

## 2018-11-28 RX ADMIN — HYDRALAZINE HYDROCHLORIDE SCH MG: 25 TABLET ORAL at 20:40

## 2018-11-28 RX ADMIN — INSULIN LISPRO SCH UNIT: 100 INJECTION, SOLUTION INTRAVENOUS; SUBCUTANEOUS at 11:30

## 2018-11-28 RX ADMIN — Medication SCH MG: at 11:00

## 2018-11-28 RX ADMIN — SODIUM CHLORIDE SCH GM: 9 INJECTION, SOLUTION INTRAVENOUS at 10:45

## 2018-11-28 RX ADMIN — INSULIN LISPRO SCH UNIT: 100 INJECTION, SOLUTION INTRAVENOUS; SUBCUTANEOUS at 16:30

## 2018-11-28 NOTE — CONSULTATION
DATE OF CONSULTATION:  November 28, 2018 



CARDIOLOGY CONSULTATION 



REFERRING PHYSICIAN:  Dr. Serafin Torres



REASON FOR CONSULTATION:  Peripheral vascular disease evaluation for lower 

extremity wounds. 



HISTORY OF PRESENT ILLNESS:  Mr. Galeano is a 68-year-old pleasant man with 

history of dyslipidemia, hypertension, diabetes mellitus, CKD, prior stroke 

with residual left-sided deficit, CAD with remote stents, history of anemia 

in the setting of ulcer-related bleeding for which anticoagulation for 

atrial fibrillation was held, and history of atrial fibrillation.  He 

presents to [facility] for worsening wounds with mild odor and 

discoloration to bilateral lower extremities.  He is undergoing evaluation 

by podiatry as well as infectious disease.  His son, who is power of 

, states he has recently been diagnosed with dementia at his 

assisted living facility.  He has been started on antibiotics.  We have 

been consulted to further evaluate for peripheral vascular disease.  Upon 

discussion with Mr. Galeano, labs today show GFR of 31.  There is no 

additional baseline to compare.  However, this is significant particularly 

for risk of contrast-induced nephropathy related to possible dye use.  His 

heel wounds I suspect are in part related to bedridden state primarily, and 

offloading has been initiated.  He has Charcot joints also and erosion of 

metatarsals and 3rd and 4th joints.  He denies any chest pain or shortness 

of breath. 



REVIEW OF SYSTEMS:  A 12-system review is negative except for as noted 

above.  



ALLERGIES:  AS PER EMR, NO KNOWN ALLERGIES.



PAST MEDICAL HISTORY:  As previously described.



SOCIAL HISTORY:  No active smoking, alcohol or drugs.



FAMILY HISTORY:  Noncontributory.



PHYSICAL EXAMINATION 

VITAL SIGNS:  Temperature 98.5, heart rate 82, blood pressure 142/63, 

respiratory rate 20, O2 sat 96%, BMI 27.8.

GENERAL:  No acute distress.  Alert.

NECK:  No JVD.

CHEST:  Clear to auscultation.

CARDIOVASCULAR:  Irregularly irregular rate and rhythm, normal S1 and S2.  

No S3 or S4.  Systolic ejection murmur 1/6.  

ABDOMEN:  Soft and nontender. 

EXTREMITIES:  2+ edema of right lower extremity, 1+ edema of left lower 

extremity.  Heel protectors in place.  Wounds noted to right heel and 4th 

and 5th digits on the left.  He is status post amputation of the 2nd digit 

at the level of the metatarsophalangeal joint and amputation of the 4th and 

5th digits at the level of the mid metatarsals.  



CARDIOVASCULAR MEDICATIONS:  Reviewed.  On:

1. Hydralazine 15 mg every 12 hours. 

2. Simvastatin 10 mg nightly.

3. Metoprolol succinate 50 mg daily.

4. Nifedipine 60 mg every 12 hours.

5. Cefepime and vancomycin antibiotics.

6. Insulin.



STUDIES:  Reviewed.  Potassium 3.2, sodium 138, bicarbonate 25, chloride 

102, BUN 35, creatinine 2.06, glucose 167.  White blood cells 14.6, 

hemoglobin 9.4, platelets 364.  INR 1.05.  AST 21, ALT 16, alk phos 89.



ASSESSMENT 

1. Foot wounds bilaterally.  Diabetic foot.

2. Acute kidney injury on chronic kidney disease versus chronic kidney 

disease, stage 3 to 4.

3. Hypertension.

4. Dyslipidemia.

5. Coronary artery disease.

6. Atrial fibrillation.

7. History of bleed from ulcer in the past with anemia noted, 9.4 today.

8. Dementia.

9. History of cerebrovascular accident.



RECOMMENDATIONS

1. Given advanced kidney dysfunction, would appreciate input from 

nephrology regarding risk for contrast-induced nephropathy and 

optimization of risks overall if feasible.  Furthermore, would like to 

obtain further data points to confirm absence of acute worsening of his 

renal function (BEKA).

2. I agree with antibiotic and wound care as well as offloading.

3. Continue the rest of the cardiovascular medications and add aspirin 

81 mg daily.

4. Will follow closely with you.



Thank you for the opportunity to participate in the care of Mr. Galeano.







DD:  11/28/2018 12:52

DT:  11/28/2018 15:10

Job#:  [id number]

## 2018-11-28 NOTE — PROGRESS NOTE
DATE:  November 28, 2018 



SUBJECTIVE:  Patient seen at bedside.  Doing significantly better.  Denies 

any history of fever, chills, nausea, or vomiting.



OBJECTIVE   

VITALS:  Afebrile, pulse rate 64, respirations 18, blood pressure 138/66, 

O2 saturation 94%. 

EXTREMITIES:  Ulceration to the right heel looks better.  Still some foul 

smell present.  Some fibrosis and minimal granulation tissue noted.  

Positive pitting edema noted to both lower extremities with pedal pulses 

nonpalpable. 



ASSESSMENT

1.  Diabetic neuropathy.

2.  Peripheral artery disease with multiple ulcerations to both lower 

extremities, healing slowly with a grade 4 ulcer, right grade 2 ulcer, 

right foot and grade 1 ulcer, left foot.



PLAN

1.  Dr. Tommy Joseph consulted for vascular evaluation.  

2.  Will continue Santyl collagenase to the right heel followed by dilute 

wet-to-dry Betadine.  Continue offloading.  Will continue Bactroban 

ointment to the other ulcerations.  Continue to treat conservatively.  

Continue IV antibiotics such as Zosyn and vanco.  











DD:  11/28/2018 09:20

DT:  11/28/2018 09:29

Job#:  [id number]

## 2018-11-28 NOTE — CONSULTATION
DATE OF CONSULTATION:    



REASON FOR CONSULTATION:  Infection in the foot, concern about 

osteomyelitis.



HISTORY OF PRESENT ILLNESS:  This patient is a 68-year-old  

male with history of diabetes mellitus, history of diabetic neuropathy, 

peripheral vascular disease, history of osteomyelitis before.  He comes in 

with ulceration on both lower extremities.  He has a stage-IV ulcer on the 

right foot and stage-I on the left foot.  The patient has been followed by 

Dr. Wright as an outpatient.  He was admitted because of worsening condition. 

 The patient had an x-ray, which showed chronic changes of bilateral feet.  

The right had amputation of 2nd digit at the level of the 

metatarsophalangeal joint and amputation of the 4th and 5th digits at the 

level of the mid metatarsals, subluxation of the 3rd metatarsophalangeal 

joint.  On the left, he had amputation of the 5th digit at the level of 

proximal metatarsal, thickening of the right 4th metatarsal, 

chronic-appearing erosive changes of the 3rd metatarsal with destruction of 

the 3rd and 4th metatarsal joints, representing Charcot neuropathy.  

Patient is being admitted and started on IV antibiotic.  Infectious disease 

was consulted.  There is concern that there are underlying erosive changes 

of osteomyelitis on the right tarsometatarsal joint.  The patient, who is 

lying in bed comfortably, has no complaints.  



LABORATORY DATA:  Reviewed.  White count of 14.6, hemoglobin 9.4, 

hematocrit 27.  Sodium 138, potassium 3.2, creatinine 2.06.  



REVIEW OF SYSTEMS

HEENT:  Negative. 

PULMONARY:  Negative.  

CARDIAC:  Negative.

:  Negative.  

SKIN:  There is no rash.  



MEDICATIONS:  He is currently on Zosyn and vancomycin.



PHYSICAL EXAMINATION

GENERAL:  He is currently alert and oriented.  Does not seem to be in acute 

distress. 

VITALS:  Stable, currently afebrile. 

HEENT:  Does not appear icteric. 

NECK:  Supple. 

CHEST:  Clear.  

HEART:  S1 and S2.  No S3, S4 or murmur.

ABDOMEN:  Soft.  Bowel sounds are present.  

EXTREMITIES:  Edema of the foot.  There is an ulcer on the right foot.  

Erythema and edema noted. 



IMPRESSION AND PLAN

1. Concern about osteomyelitis in a patient with Charcot joint, 

peripheral vascular disease and  diabetes mellitus.  

2. Acute kidney injury over chronic kidney disease.  

3. Diabetes mellitus.

4. Concern about underlying history of dementia with dysphagia.  

5. Hemiplegia affecting the left, nondominant side.

6. Debility.  

7. Chronic kidney disease.  



Will put the patient on vancomycin 1 gram q. 24 h.  Obtain trough with the 

3rd dose.  Change him to cefepime 1 gram daily.  Obtain sed rate and 

C-reactive protein.  Will treat as osteo. 



Will follow with you.









DD:  11/28/2018 10:40

DT:  11/28/2018 10:55

Job#:  [id number]

## 2018-11-28 NOTE — CONSULTATION
DATE OF CONSULTATION:  November 28, 2018 



REASON FOR CONSULTATION:  Inability to place a Perry catheter and urinary 

retention.



HISTORY OF PRESENT ILLNESS:  Mr. Fabián Galeano Jr. is a 68-year-old man 

who lives in a nursing home, status post multiple strokes.  The patient has 

had urinary incontinence.  He has been treated by his primary care 

physician for prostatism with prostatic medications, according to the 

patient's son.  The patient uses a diaper.  He lives in [facility]  According to the son, he has never seen a urologist.  He has never 

had urolithiasis, and never had urinary tract infections.  The patient is 

currently admitted with cellulitis of the right heel and a diabetic ulcer.  

The nurse this evening decided the patient was distended.  She performed a 

bladder scan and found that the patient had urinary retention of over 999 

mL.  The nurse was unable to get a Perry catheter past the fossa 

navicularis and urological consultation was emergently sought.  



PAST MEDICAL AND SURGICAL HISTORY:   

1. Status post appendectomy. 

2. Status post cholecystectomy. 

3. Multiple cerebrovascular accidents.

4. Hypertension. 

5. Diabetes. 

6. Hypercholesterolemia. 

7. Cellulitis and diabetic ulcers. 

8. Chronic urinary incontinence. 

9. Renal insufficiency. 

10. Left renal cyst by renal ultrasonography done at 4 o'clock this 

afternoon.  This ultrasonography test also showed a markedly distended 

urinary bladder with prominent collective systems and a Perry catheter 

decompression was recommended.  However, it does not seem like the 

radiologist actually called anybody.  

11. Status post surgery for colon cancer.  

 

SOCIAL HISTORY:  The patient denies smoking, alcohol or drug use.  He is a 

retired .  



FAMILY HISTORY:  Noncontributory to the urological problems.  



REVIEW OF SYSTEMS:  Consistent with history of present illness and past 

medical history, otherwise negative for all systems.  



CURRENT MEDICATIONS:  Please refer to the MAR.



ALLERGIES:  NO KNOWN DRUG ALLERGIES.  



PHYSICAL EXAMINATION 

GENERAL:  A debilitated man, lying in bed in no apparent distress.  

VITAL SIGNS:  He is currently afebrile.  His vital signs are currently 

stable. 

ABDOMEN:  Soft.  It is distended suprapubically to above the umbilicus 

without costovertebral angle tenderness.  Kidneys are not palpable.  

Costovertebral angle with no obvious evidence of  hernia.  Multiple scars 

consistent with prior surgery.  

GENITOURINARY:   Testes are descended bilaterally.  There is mild 

penoscrotal edema.  The patient has an uncircumcised male phallus with mild 

phimosis.  



For the remaining physical examination systems, please refer to the 

admission history and physical on the chart.



PROCEDURE:  The patient had a fossa navicularis stricture.  It was dilated 

utilizing Filiform and followers to 18-Faroese in size, and then I placed a 

16-Faroese [place] tipped catheter with 15 mL inflated into the 10 mL 

balloon.  We immediately received well over 2600 mL and there seemed to be 

additional urine flowing.  We stopped at that number because that is the 

capacity of the Perry catheter bag. 



LABORATORY DATA:  Renal ultrasonography showed markedly distended urinary 

bladder in left kidney and prominent collecting systems.  White blood cell 

count is 14,600.  Hemoglobin 9.4, platelets 364,000.  The patient's 

creatinine is elevated at 2.06.  His potassium is low at 3.2.  Urinalysis 

is significant for 6-10 WBCs, many bacteria and no urine culture is 

pending.  



ASSESSMENT

1. Severe urinary retention for over 2600 mL. 

2. Fossa navicularis urethral stricture disease. 

3. Urinary incontinence. 

4. Probable neurogenic bladder. 

5. Presumably acute renal failure. 

6. Left renal cyst. 

7. Bilateral hydronephrosis. 

8. Penoscrotal edema.  

9. Mild phimosis. 

10. Anemia. 

11. Leukocytosis. 

12. Hypokalemia. 

13. Probable urinary tract infection.  



PLAN:  

1. Do not remove the Perry catheter.  

2. Will order a CT stone protocol.  

3. I defer the management of the possible post obstructive diuresis to 

the nephrologist on the case. 

4. Ongoing urological followup is a must. 

5. At some point, cystoscopy and retrograde pyelograms will most likely 

be required.  In this particular patient, placement of a suprapubic tube 

may be necessitated as long-term management of a potentially 

nonfunctional bladder.  





Thank you very much for allowing us to participate in the care of your 

patient.  I will be happy to follow him along with you as well as an 

outpatient 





DD:  11/28/2018 18:41

DT:  11/28/2018 18:48

Job#:  [id number] 



cc:LASHONDA PORTER MD 

cc:JASON BLOUNT MD 

cc:JAC COOL MD 

cc:NATALIA SIDDIQUI MD 

cc:MARELY BALDERAS DPM 

cc:              DR. VIKAS __________.

## 2018-11-28 NOTE — DIAGNOSTIC IMAGING REPORT
Retroperitoneal ultrasound 



Indication: Renal failure



Technique: Select images from retroperitoneal ultrasound provided for

interpretation:



Comparison: No prior studies for comparison.



Findings: 



The right kidney measures 11.0 cm in greatest length. The echotexture is

normal. There is no evidence for mass.  The collecting system is distended.  No

renal calculi evident. No adjacent free fluid or fluid collections. 



The left kidney measures 10.4 cm in greatest length. The echotexture is normal.

 There is a cyst in the upper pole measuring 2.4 x 2.0 x 2.3 cm. The collecting

system is distended. No renal calculi evident. No adjacent free fluid or fluid

collections.



Bladder is markedly distended. No mural thickening or intraluminal calculi.

Right ureteral jet is visible.



 No free fluid in the pelvis.



Survey images of the liver demonstrate no abnormalities.





IMPRESSION:



1. Normal renal echotexture.

2.  Markedly distended bladder and prominence of the renal collecting systems.

Perry decompression is recommended.

3. Cyst in the left kidney as described above.



Signed by: Dr. Earline Zimmerman MD on 11/28/2018 4:54 PM

## 2018-11-28 NOTE — CONSULTATION
DATE OF CONSULTATION:  November 27, 2018 



REASON FOR CONSULTATION:  Pregangrenous changes to the right heel with 

possible osteomyelitis, with patient being diabetic with a nonhealing ulcer 

for at least several months, undetermined due to the fact that patient is 

somewhat confused.



HISTORY OF PRESENT ILLNESS:  This is a pleasant, 68-year-old  male, 

who was seen at the emergency room secondary to being transferred via 

ambulance due to foul smell to his right heel.  Patient was going to have 

an angiogram and possible angioplasty done when it was determined he would 

be better off in the hospital.  Upon questioning the patient, the patient 

is confused but is able to answer most of the questions asked.  He denies 

any history of fever, chills, nausea or vomiting.  He has had a nonhealing 

ulceration for several months.  According to the patient, the patient has 

been taken care of by his son and is currently in a nursing home.  Time in 

the nursing home is unknown.  Patient has been seen in the office for at 

least several weeks now, and the ulcer has been improving very slowly.



PAST MEDICAL HISTORY:  Remarkable for insulin-dependent diabetes for 

20-plus years and hypertension.



ALLERGIES:  PATIENT DENIES.



SURGICAL HISTORY:  Remarkable for bilateral partial amputations of both 

feet with 1st ray amputation to the left, partial 4th and 5th metatarsal 

amputations right foot with amputations to the 2nd, 4th and 5th toes of the 

right foot.



SOCIAL HISTORY:  No drinking, smoking, recreational drug use.  Does not 

walk.  Has 5 kids.  Wife, according to the patient, is still living.



FAMILY HISTORY:  Remarkable for diabetes.



CURRENT MEDICATIONS:  Noted list in chart including IV Zosyn and 

vancomycin.  



VITALS:  Patient is afebrile, pulse rate 78, respirations 18, O2 saturation 

98% with a blood pressure of 139/63.  



LABS:  Noted.  Has a white blood cell count of 14.6, hemoglobin 9.4, 

hematocrit 27.6 with a platelet count of 364.  Has a blood glucose of 167.  

Albumin low at 2.7.



REVIEW OF SYSTEMS

CARDIAC:  Denies any palpitations or arrhythmias. 

RESPIRATORY:  Denies any shortness of breath or productive cough. 

GASTROINTESTINAL:  Denies any diarrhea or constipation. 

GENITOURINARY:  Denies any hematuria or problems voiding. 

 

PODIATRIC PHYSICAL EXAMINATION

VASCULAR  Pedal pulses, both the DP and PT, are greatly diminished.  Skin 

temperature warm and cool to touch. 

NEUROLOGICAL  Loss of protective sensation when utilizing [place] 

5.07 monofilament wire. 

MUSCULOSKELETAL  Exam shows muscle mass to be symmetrical and muscle 

strength to be 3/5 to 4/5 to all muscle groups.  Has some pitting edema to 

both lower extremities. 

DERMATOLOGICAL  A grade-4 ulcer with foul smell, right heel, measuring more 

than 6 to 7 cm in diameter.  Periwound cellulitis is present with some 

drainage.  Has a grade-2 ulcer plantar aspect right foot measuring 1.5 to 2 

cm in diameter.  Some necrosis noted on subcutaneous tissue.  Has an 

ulceration down to dermis to the medial aspect of left foot dorsally. 



IMAGING:  X-rays taken and reviewed.  No gas in the tissue and no 

periosteal reaction at this time.  



ASSESSMENT

1. Grade-4 ulcer with possible osteomyelitis, right foot.  

2. Grade-2 ulcer, plantar aspect right foot.  

3. Grade-1 ulcer, left foot.  

4. Peripheral arterial disease.  

5. Diabetic neuropathy with cellulitis.  

6. Lymphedema.



PLAN:  Sharp excisional debridement of the ulcer to the right foot.  Both 

plantar ulcer and posterior ulcers were debrided at bedside down to bone 

and subcutaneous tissue, right heel and plantar aspect of the right foot.  

Devitalized tissue was sharply excised until good, viable, bleeding tissue 

was achieved.  Deep cultures were taken for aerobic and anaerobic growth.  

Sterile dressing was applied.  Will start Santyl collagenase followed by 

dilute wet-to-dry Betadine dressing to the right heel ulcer and Bactroban 

ointment to the ulcer plantarly right foot and also Bactroban ointment to 

the ulcer left foot.  Dr. Joseph will be consulted for vascular 

evaluation.  Continue offloading with pillow under calf.  Patient is aware 

of possible amputation if not responsive to conservative care and serial 

debridements.  







DD:  11/28/2018 09:18

DT:  11/28/2018 11:51

Job#:  [id number]

## 2018-11-29 VITALS — DIASTOLIC BLOOD PRESSURE: 65 MMHG | SYSTOLIC BLOOD PRESSURE: 138 MMHG

## 2018-11-29 VITALS — SYSTOLIC BLOOD PRESSURE: 114 MMHG | DIASTOLIC BLOOD PRESSURE: 56 MMHG

## 2018-11-29 VITALS — DIASTOLIC BLOOD PRESSURE: 63 MMHG | SYSTOLIC BLOOD PRESSURE: 135 MMHG

## 2018-11-29 VITALS — SYSTOLIC BLOOD PRESSURE: 138 MMHG | DIASTOLIC BLOOD PRESSURE: 65 MMHG

## 2018-11-29 VITALS — SYSTOLIC BLOOD PRESSURE: 128 MMHG | DIASTOLIC BLOOD PRESSURE: 58 MMHG

## 2018-11-29 VITALS — SYSTOLIC BLOOD PRESSURE: 115 MMHG | DIASTOLIC BLOOD PRESSURE: 56 MMHG

## 2018-11-29 VITALS — DIASTOLIC BLOOD PRESSURE: 52 MMHG | SYSTOLIC BLOOD PRESSURE: 124 MMHG

## 2018-11-29 LAB
ANION GAP SERPL CALC-SCNC: 14.4 MMOL/L (ref 8–16)
ANION GAP SERPL CALC-SCNC: 15.8 MMOL/L (ref 8–16)
BASOPHILS # BLD AUTO: 0.1 10*3/UL (ref 0–0.1)
BASOPHILS NFR BLD AUTO: 0.4 % (ref 0–1)
BUN SERPL-MCNC: 42 MG/DL (ref 7–26)
BUN SERPL-MCNC: 46 MG/DL (ref 7–26)
BUN/CREAT SERPL: 18 (ref 6–25)
BUN/CREAT SERPL: 18 (ref 6–25)
CALCIUM SERPL-MCNC: 8.4 MG/DL (ref 8.4–10.2)
CALCIUM SERPL-MCNC: 8.5 MG/DL (ref 8.4–10.2)
CHLORIDE SERPL-SCNC: 106 MMOL/L (ref 98–107)
CHLORIDE SERPL-SCNC: 107 MMOL/L (ref 98–107)
CO2 SERPL-SCNC: 19 MMOL/L (ref 22–29)
CO2 SERPL-SCNC: 20 MMOL/L (ref 22–29)
DEPRECATED NEUTROPHILS # BLD AUTO: 11.7 10*3/UL (ref 2.1–6.9)
EGFRCR SERPLBLD CKD-EPI 2021: 25 ML/MIN (ref 60–?)
EGFRCR SERPLBLD CKD-EPI 2021: 28 ML/MIN (ref 60–?)
EOSINOPHIL # BLD AUTO: 0.1 10*3/UL (ref 0–0.4)
EOSINOPHIL NFR BLD AUTO: 0.4 % (ref 0–6)
ERYTHROCYTE [DISTWIDTH] IN CORD BLOOD: 13.5 % (ref 11.7–14.4)
ERYTHROCYTE [DISTWIDTH] IN CORD BLOOD: 13.5 % (ref 11.7–14.4)
GLUCOSE SERPLBLD-MCNC: 234 MG/DL (ref 74–118)
GLUCOSE SERPLBLD-MCNC: 84 MG/DL (ref 74–118)
HCT VFR BLD AUTO: 25.8 % (ref 38.2–49.6)
HCT VFR BLD AUTO: 26.4 % (ref 38.2–49.6)
HGB BLD-MCNC: 8.7 G/DL (ref 14–18)
HGB BLD-MCNC: 8.8 G/DL (ref 14–18)
LYMPHOCYTES # BLD: 1 10*3/UL (ref 1–3.2)
LYMPHOCYTES NFR BLD AUTO: 7.1 % (ref 18–39.1)
MCH RBC QN AUTO: 30.4 PG (ref 28–32)
MCH RBC QN AUTO: 31.2 PG (ref 28–32)
MCHC RBC AUTO-ENTMCNC: 33 G/DL (ref 31–35)
MCHC RBC AUTO-ENTMCNC: 34.1 G/DL (ref 31–35)
MCV RBC AUTO: 91.5 FL (ref 81–99)
MCV RBC AUTO: 92.3 FL (ref 81–99)
MONOCYTES # BLD AUTO: 1.1 10*3/UL (ref 0.2–0.8)
MONOCYTES NFR BLD AUTO: 8 % (ref 4.4–11.3)
NEUTS SEG NFR BLD AUTO: 83.7 % (ref 38.7–80)
PLATELET # BLD AUTO: 262 X10E3/UL (ref 140–360)
PLATELET # BLD AUTO: 291 X10E3/UL (ref 140–360)
POTASSIUM SERPL-SCNC: 3.4 MMOL/L (ref 3.5–5.1)
POTASSIUM SERPL-SCNC: 3.8 MMOL/L (ref 3.5–5.1)
RBC # BLD AUTO: 2.82 X10E6/UL (ref 4.3–5.7)
RBC # BLD AUTO: 2.86 X10E6/UL (ref 4.3–5.7)
SODIUM SERPL-SCNC: 137 MMOL/L (ref 136–145)
SODIUM SERPL-SCNC: 138 MMOL/L (ref 136–145)

## 2018-11-29 RX ADMIN — INSULIN LISPRO SCH UNIT: 100 INJECTION, SOLUTION INTRAVENOUS; SUBCUTANEOUS at 08:00

## 2018-11-29 RX ADMIN — SODIUM CHLORIDE SCH GM: 9 INJECTION, SOLUTION INTRAVENOUS at 10:45

## 2018-11-29 RX ADMIN — INSULIN LISPRO SCH UNIT: 100 INJECTION, SOLUTION INTRAVENOUS; SUBCUTANEOUS at 12:00

## 2018-11-29 RX ADMIN — SODIUM CHLORIDE SCH MLS/HR: 9 INJECTION, SOLUTION INTRAVENOUS at 04:00

## 2018-11-29 RX ADMIN — INSULIN LISPRO SCH UNIT: 100 INJECTION, SOLUTION INTRAVENOUS; SUBCUTANEOUS at 07:30

## 2018-11-29 RX ADMIN — COLLAGENASE SANTYL SCH GM: 250 OINTMENT TOPICAL at 10:00

## 2018-11-29 RX ADMIN — MUPIROCIN SCH GM: 20 OINTMENT TOPICAL at 10:00

## 2018-11-29 RX ADMIN — ACETAMINOPHEN PRN MG: 325 SOLUTION ORAL at 06:40

## 2018-11-29 RX ADMIN — INSULIN LISPRO SCH UNIT: 100 INJECTION, SOLUTION INTRAVENOUS; SUBCUTANEOUS at 20:20

## 2018-11-29 RX ADMIN — SODIUM CHLORIDE SCH MLS/HR: 9 INJECTION, SOLUTION INTRAVENOUS at 12:00

## 2018-11-29 RX ADMIN — MUPIROCIN SCH GM: 20 OINTMENT TOPICAL at 17:00

## 2018-11-29 RX ADMIN — Medication SCH MG: at 11:00

## 2018-11-29 RX ADMIN — SIMVASTATIN SCH MG: 20 TABLET, FILM COATED ORAL at 21:16

## 2018-11-29 RX ADMIN — PANTOPRAZOLE SODIUM SCH MG: 40 TABLET, DELAYED RELEASE ORAL at 09:00

## 2018-11-29 RX ADMIN — SODIUM CHLORIDE SCH MLS/HR: 9 INJECTION, SOLUTION INTRAVENOUS at 14:00

## 2018-11-29 RX ADMIN — INSULIN LISPRO SCH UNIT: 100 INJECTION, SOLUTION INTRAVENOUS; SUBCUTANEOUS at 17:00

## 2018-11-29 RX ADMIN — HYDRALAZINE HYDROCHLORIDE SCH MG: 25 TABLET ORAL at 21:16

## 2018-11-29 RX ADMIN — ASPIRIN 81 MG CHEWABLE TABLET SCH MG: 81 TABLET CHEWABLE at 09:00

## 2018-11-29 RX ADMIN — INSULIN LISPRO SCH UNIT: 100 INJECTION, SOLUTION INTRAVENOUS; SUBCUTANEOUS at 11:30

## 2018-11-29 RX ADMIN — INSULIN LISPRO SCH UNIT: 100 INJECTION, SOLUTION INTRAVENOUS; SUBCUTANEOUS at 16:30

## 2018-11-29 RX ADMIN — HYDRALAZINE HYDROCHLORIDE SCH MG: 25 TABLET ORAL at 09:00

## 2018-11-29 RX ADMIN — METOPROLOL SUCCINATE SCH MG: 50 TABLET, EXTENDED RELEASE ORAL at 09:00

## 2018-11-29 NOTE — PROGRESS NOTE
DATE:  November 29, 2018 



CARDIOLOGY PROGRESS NOTE



SUBJECTIVE:  No new complaints.  



OBJECTIVE   

VITAL SIGNS:  Temperature 96.7, heart rate 58, respiratory rate 18, blood 

pressure 124/52, O2 sat 96%.  BMI 27.8. 

GENERAL:  In no acute distress, alert. 

NECK:  No JVD. 

CHEST:  Clear to auscultation. 

CARDIOVASCULAR:  Regular rate and rhythm.  Normal S1 and S2.

ABDOMEN:  Soft. 

EXTREMITIES:  Edema to bilateral lower extremities, somewhat improved from 

yesterday with heels to bilateral feet.  Heel protectors in place.  



CARDIOVASCULAR MEDICATIONS:  Reviewed.  

1. Simvastatin 10 mg nightly. 

2. Vancomycin and cefepime. 

3. Metoprolol succinate 50 mg daily. 

4. Aspirin 81 mg daily. 

5. Nifedipine 60 mg q.12 h. 

6. Hydralazine 50 mg q.12 h. 



STUDIES:  Reviewed.  Sodium 138, potassium 3.4, bicarbonate 20, creatinine 

2.58, trending up.  Hemoglobin 8.8.  White blood cells 15.7, platelets 

262,000.  INR 1.05.  Normal transaminases.  



ASSESSMENT:  

1. Acute kidney injury on chronic kidney disease. 

2. Hypertension. 

3. Dyslipidemia. 

4. Bilateral foot wounds with diabetic foot. 

5. Coronary artery disease. 

6. Atrial fibrillation. 

7. History of bleed from lower extremity ulcers in the past, reported 

the patient with noted anemia. 

8. Dementia. 

9. History of cerebrovascular accident. 

10. Obstructive uropathy. 



PLAN:  A Perry catheter placed today.  The patient undergoing evaluation by 

urology as well as nephrology.  In the setting of acute kidney injury, will 

hold off from angiography at this point in time, and wait for renal 

recovery and stability of renal function.  For now, continue rest of 

cardiovascular medications.  Will follow closely with you.  











DD:  11/29/2018 18:49

DT:  11/29/2018 19:55

Job#:  [id number]

## 2018-11-29 NOTE — CONSULTATION
DATE OF CONSULTATION:  November 29, 2018 



RENAL CONSULTATION 



ADMITTING PHYSICIAN:  Serafin Torres MD



HISTORY OF PRESENT ILLNESS:  This 68-year-old male, with history of CVA and 

dementia, was admitted to [facility] with diabetic 

foot wound.  The patient is a poor historian, and history is taken from 

him, the medical record and the nurse at the bedside.  The patient 

developed stage-IV ulcer of the right foot at the nursing home and was 

being followed by Dr. Wright as an outpatient.  The patient failed outpatient 

treatment and was admitted to [facility].  The 

patient was seen by infectious disease, as there was a concern for 

osteomyelitis, as well as by cardiology for possible angiogram.  The 

patient was noted to have worsening kidney function, and nephrology 

consultation was called.  The patient had renal ultrasound, which showed 

the patient was in urinary retention.  The nursing staff was unable to 

place a Perry, and urology consultation was called.  Perry catheter was 

placed, and 2600 mL of urine was produced in the Perry bag.  The patient at 

this time is comfortable and has no further complaints.



REVIEW OF SYSTEMS:  As above.  All other systems are negative.



PAST MEDICAL HISTORY 

1. Multiple strokes.

2. Diabetes, type 2, complicated by neuropathy.

3. Peripheral arterial disease.

4. History of osteomyelitis.

5. Stage-IV ulcer of the right foot.

6. Chronic kidney disease.

7. Dyslipidemia.

8. Chronic urinary incontinence.

9. Left renal cyst.

10. History of surgery for colon cancer.

11. Appendectomy. 

12. Cholecystectomy.



PAST SURGICAL HISTORY:  As above.



SOCIAL HISTORY:  No tobacco.  No alcohol.  Lives in a nursing home.



FAMILY HISTORY:  Noncontributory. 



ALLERGIES:  NO KNOWN DRUG ALLERGIES.



CURRENT MEDICATIONS:  See list.  Includes vancomycin.



PHYSICAL EXAMINATION 

VITALS:  Blood pressure 114/56, pulse 60, respiratory rate 16, temperature 

99.6.

GENERAL:  No apparent distress.

HEENT:  Oropharynx is clear.  No scleral icterus.  No periorbital edema.

NECK:  Supple.  No elevation of jugular venous pressure.  No 

lymphadenopathy.  

CHEST:  Clear to auscultation anteriorly bilaterally.

CARDIOVASCULAR:  Regular rhythm.  No murmurs or rubs.

ABDOMEN:  Soft.  Positive bowel sounds.  No tenderness.  No rebound.

EXTREMITIES:  Trace edema. 

SKIN:  Warm.  



LABS:  White count 14, hemoglobin 8.7, hematocrit 26.4, platelets 291.  

Sodium 137, potassium 3.8, chloride 106, CO2 19, BUN 42, creatinine 2.3, 

glucose 234.  



Renal ultrasound:  Markedly distended bladder.  Prominence of the renal 

collecting system.  Perry decompression is recommended.  Cyst on left 

kidney as described above, 2.4 x 2 x 2.3 cm. 



X-ray of the foot:  Underlying erosive changes or osteomyelitis cannot be 

entirely excluded.



Chest x-ray:  Right lower lung field hazy opacification representing 

subsegmental atelectasis or developing pneumonia.  



ASSESSMENT AND PLAN 

1. Acute kidney injury on chronic kidney disease, unknown what the 

patient's baseline is.  Will need to get outpatient labs.  Suspect acute 

component due to ongoing infection as well as urinary retention.  Perry 

catheter has been placed.  Continue antibiotics.  Will need to follow 

vancomycin trough levels.  Avoid all nephrotoxic medications.  The 

patient is at risk for contrast-induced nephropathy.  Will need to see 

where his baseline is and take precautions with IV fluids and Mucomyst 

and limit the amount of contrast.

2. Electrolytes acceptable.

3. Edematous on exam.  Will follow now that urinary retention has been 

relieved.

4. Renal cyst.  Urology following.

5. Diabetic foot wound.  Antibiotics per infectious disease.

6. Diabetes per primary team.

7. Hypertension, controlled.

8. Anemia.  Will check iron stores.





 _________________________________

NATALIA SIDDIQUI MD



DD:  11/29/2018 09:12

DT:  11/29/2018 11:16

Job#:  [id number]

## 2018-11-29 NOTE — DIAGNOSTIC IMAGING REPORT
PROCEDURE: CT ABDOMEN AND PELVIS WITHOUT CONTRAST

 

TECHNIQUE: 

The abdomen and pelvis were scanned utilizing a multidetector helical 

scanner from the diaphragm to the lesser trochanter.  No IV contrast 

was administered per protocol.  Coronal and sagittal multiplanar 

reformations were obtained.

 

COMPARISON:   Renal ultrasound 11/28/18.

 

INDICATIONS:   STONE

 

FINDINGS:

 

ABSENCE OF INTRAVENOUS CONTRAST DECREASES SENSITIVITY FOR DETECTION OF 

FOCAL LESIONS AND VASCULAR PATHOLOGY.

 

LOWER THORAX: Moderate bilateral pleural effusions, right greater than 

left with associated subsegmental atelectasis. Coronary 

atherosclerosis. A 2 mm calcified granuloma in the left lower lobe. 

 

HEPATOBILIARY: No focal hepatic lesions.  No biliary ductal dilatation. 

Mildly distended gallbladder without radiopaque stone. 

SPLEEN: No splenomegaly.

PANCREAS: No focal masses or ductal dilatation.

 

ADRENALS: No adrenal nodules.

KIDNEYS/URETERS: No hydronephrosis or solid mass lesions. Left upper 

pole renal cyst. Subcentimeter hypodensity in the right kidney is too 

small to characterize but likely represents a cyst. Punctate 2 mm 

bilateral mid pole non-obstructing stones. Non-specific bilateral 

perinephric stranding. 

PELVIC ORGANS/BLADDER: Perry catheter is in a decompressed bladder.

 

PERITONEUM / RETROPERITONEUM: No free air or fluid.

LYMPH NODES: Prominent bilateral inguinal lymph nodes measuring up to 

1.6 cm with fatty hilum. 

VESSELS: Extensive atherosclerotic calcifications of the abdominal 

aorta and branch vessels.

 

GI TRACT: No distention or wall thickening. Colonic anastomoses are 

present in the pelvis and right lower quadrant.

 

BONES AND SOFT TISSUES: No acute bony findings. Degenerative changes of 

the visualized spine. 

 

IMPRESSION:

 

 

No evidence of hydronephrosis. Decompressed bladder with Perry catheter 

in place. 

 

Bilateral 2 mm mid pole non-obstructing renal stones. Bilateral 

perinephric stranding, a non-specific finding, but could reflect 

pyelonephritis in the appropriate clinical context. 

 

Moderate bilateral pleural effusions. 

 

Dictated by:  FABIOLA SIDDIQI M.D. on 11/29/2018 at 9:16     

Electronically approved by:  FABIOLA SIDDIQI M.D. on 11/29/2018 at 9:16

## 2018-11-29 NOTE — PROGRESS NOTE
DATE:  November 29, 2018 



SUBJECTIVE:  Patient was seen at bedside.  No distress.  Denying any 

history of fever, chills, nausea, or vomiting.  



OBJECTIVE   

VITALS:  Afebrile, pulse rate 60, respirations 16, blood pressure 114/56, 

O2 saturation 97%. 

EXTREMITIES:  There is a foul smell noted to the ulceration to the right 

foot measuring more than 5-6 cm in diameter with possible osteomyelitis.  

Positive edema to both lower extremities with pedal pulses diminished.



LABS:  Noted.  Has a white blood cell count of 14.03, hemoglobin 8.7, 

hematocrit 26.4 with a platelet count of 291,000.  BUN and creatinine 

levels are high.  



ASSESSMENT

1.  Peripheral arterial disease with a grade 4 ulcer, right foot, grade 2 

ulcer plantar aspect, right foot with a grade 1 ulcer, left foot. 

2.  Diabetic grade 4 pressure ulcer to the right foot with osteomyelitis.  

3.  Peripheral arterial disease.

4.  Diabetic neuropathy.  



PLAN:  Continue Santyl followed by dilute wet-to-dry Betadine to the 

ulceration, right heel.  Continue Bactroban ointment to the plantar ulcer 

of the right foot and medial ulceration of left lower extremity.  Will 

continue to treat conservatively for now with IV cefepime, vancomycin 

and local wound care.  Patient will eventually need further debridement.  

Will await edema to go down and possible revascularization before any 

further surgery is attempted at this point.  









DD:  11/29/2018 08:56

DT:  11/29/2018 09:27

Job#:  [id number]

## 2018-11-30 VITALS — SYSTOLIC BLOOD PRESSURE: 107 MMHG | DIASTOLIC BLOOD PRESSURE: 69 MMHG

## 2018-11-30 VITALS — SYSTOLIC BLOOD PRESSURE: 126 MMHG | DIASTOLIC BLOOD PRESSURE: 66 MMHG

## 2018-11-30 VITALS — SYSTOLIC BLOOD PRESSURE: 102 MMHG | DIASTOLIC BLOOD PRESSURE: 56 MMHG

## 2018-11-30 VITALS — SYSTOLIC BLOOD PRESSURE: 135 MMHG | DIASTOLIC BLOOD PRESSURE: 62 MMHG

## 2018-11-30 VITALS — DIASTOLIC BLOOD PRESSURE: 51 MMHG | SYSTOLIC BLOOD PRESSURE: 137 MMHG

## 2018-11-30 VITALS — DIASTOLIC BLOOD PRESSURE: 67 MMHG | SYSTOLIC BLOOD PRESSURE: 140 MMHG

## 2018-11-30 LAB
ALPHA2 GLOB 24H UR ELPH-MCNC: 5.1 %
ANION GAP SERPL CALC-SCNC: 17.9 MMOL/L (ref 8–16)
BASOPHILS # BLD AUTO: 0.1 10*3/UL (ref 0–0.1)
BASOPHILS NFR BLD AUTO: 0.3 % (ref 0–1)
BUN SERPL-MCNC: 53 MG/DL (ref 7–26)
BUN/CREAT SERPL: 19 (ref 6–25)
CALCIUM SERPL-MCNC: 8.6 MG/DL (ref 8.4–10.2)
CHLORIDE SERPL-SCNC: 109 MMOL/L (ref 98–107)
CO2 SERPL-SCNC: 17 MMOL/L (ref 22–29)
DEPRECATED NEUTROPHILS # BLD AUTO: 15.3 10*3/UL (ref 2.1–6.9)
EGFRCR SERPLBLD CKD-EPI 2021: 22 ML/MIN (ref 60–?)
EOSINOPHIL # BLD AUTO: 0 10*3/UL (ref 0–0.4)
EOSINOPHIL NFR BLD AUTO: 0.2 % (ref 0–6)
ERYTHROCYTE [DISTWIDTH] IN CORD BLOOD: 13.6 % (ref 11.7–14.4)
FERRITIN SERPL-MCNC: 4207.96 NG/ML (ref 21.81–274.66)
GLUCOSE SERPLBLD-MCNC: 82 MG/DL (ref 74–118)
HCT VFR BLD AUTO: 26.1 % (ref 38.2–49.6)
HGB BLD-MCNC: 8.9 G/DL (ref 14–18)
IRON SATN MFR SERPL: 54 % (ref 15–50)
IRON SERPL-MCNC: 96 UG/DL (ref 65–175)
LYMPHOCYTES # BLD: 1.7 10*3/UL (ref 1–3.2)
LYMPHOCYTES NFR BLD AUTO: 9.1 % (ref 18–39.1)
MAGNESIUM SERPL-MCNC: 1.8 MG/DL (ref 1.3–2.1)
MCH RBC QN AUTO: 31.3 PG (ref 28–32)
MCHC RBC AUTO-ENTMCNC: 34.1 G/DL (ref 31–35)
MCV RBC AUTO: 91.9 FL (ref 81–99)
MONOCYTES # BLD AUTO: 1.7 10*3/UL (ref 0.2–0.8)
MONOCYTES NFR BLD AUTO: 9.2 % (ref 4.4–11.3)
NEUTS SEG NFR BLD AUTO: 80.4 % (ref 38.7–80)
PLATELET # BLD AUTO: 251 X10E3/UL (ref 140–360)
POTASSIUM SERPL-SCNC: 3.9 MMOL/L (ref 3.5–5.1)
RBC # BLD AUTO: 2.84 X10E6/UL (ref 4.3–5.7)
SODIUM SERPL-SCNC: 140 MMOL/L (ref 136–145)
TIBC SERPL-MCNC: 178 UG/DL (ref 261–478)
TRANSFERRIN SERPL-MCNC: 127 MG/DL (ref 174–364)

## 2018-11-30 RX ADMIN — INSULIN LISPRO SCH UNIT: 100 INJECTION, SOLUTION INTRAVENOUS; SUBCUTANEOUS at 11:30

## 2018-11-30 RX ADMIN — Medication SCH MG: at 00:43

## 2018-11-30 RX ADMIN — HYDRALAZINE HYDROCHLORIDE SCH MG: 25 TABLET ORAL at 21:00

## 2018-11-30 RX ADMIN — PANTOPRAZOLE SODIUM SCH MG: 40 TABLET, DELAYED RELEASE ORAL at 09:04

## 2018-11-30 RX ADMIN — ACETAMINOPHEN PRN MG: 325 SOLUTION ORAL at 18:34

## 2018-11-30 RX ADMIN — MUPIROCIN SCH GM: 20 OINTMENT TOPICAL at 16:34

## 2018-11-30 RX ADMIN — Medication SCH MG: at 12:17

## 2018-11-30 RX ADMIN — SODIUM CHLORIDE SCH GM: 9 INJECTION, SOLUTION INTRAVENOUS at 09:45

## 2018-11-30 RX ADMIN — Medication SCH MG: at 23:00

## 2018-11-30 RX ADMIN — METRONIDAZOLE SCH MLS/HR: 500 INJECTION, SOLUTION INTRAVENOUS at 21:41

## 2018-11-30 RX ADMIN — SODIUM CHLORIDE SCH MLS/HR: 9 INJECTION, SOLUTION INTRAVENOUS at 00:00

## 2018-11-30 RX ADMIN — INSULIN LISPRO SCH UNIT: 100 INJECTION, SOLUTION INTRAVENOUS; SUBCUTANEOUS at 12:17

## 2018-11-30 RX ADMIN — SIMVASTATIN SCH MG: 20 TABLET, FILM COATED ORAL at 20:58

## 2018-11-30 RX ADMIN — COLLAGENASE SANTYL SCH GM: 250 OINTMENT TOPICAL at 09:04

## 2018-11-30 RX ADMIN — METRONIDAZOLE SCH MLS/HR: 500 INJECTION, SOLUTION INTRAVENOUS at 14:31

## 2018-11-30 RX ADMIN — SODIUM CHLORIDE SCH GM: 9 INJECTION, SOLUTION INTRAVENOUS at 22:42

## 2018-11-30 RX ADMIN — INSULIN LISPRO SCH UNIT: 100 INJECTION, SOLUTION INTRAVENOUS; SUBCUTANEOUS at 09:04

## 2018-11-30 RX ADMIN — SODIUM CHLORIDE SCH MLS/HR: 9 INJECTION, SOLUTION INTRAVENOUS at 12:47

## 2018-11-30 RX ADMIN — INSULIN LISPRO SCH UNIT: 100 INJECTION, SOLUTION INTRAVENOUS; SUBCUTANEOUS at 07:30

## 2018-11-30 RX ADMIN — SODIUM CHLORIDE SCH GM: 9 INJECTION, SOLUTION INTRAVENOUS at 00:13

## 2018-11-30 RX ADMIN — METOPROLOL SUCCINATE SCH MG: 50 TABLET, EXTENDED RELEASE ORAL at 09:04

## 2018-11-30 RX ADMIN — INSULIN LISPRO SCH UNIT: 100 INJECTION, SOLUTION INTRAVENOUS; SUBCUTANEOUS at 20:59

## 2018-11-30 RX ADMIN — MUPIROCIN SCH GM: 20 OINTMENT TOPICAL at 09:04

## 2018-11-30 RX ADMIN — HYDRALAZINE HYDROCHLORIDE SCH MG: 25 TABLET ORAL at 09:04

## 2018-11-30 RX ADMIN — INSULIN LISPRO SCH UNIT: 100 INJECTION, SOLUTION INTRAVENOUS; SUBCUTANEOUS at 18:11

## 2018-11-30 RX ADMIN — INSULIN LISPRO SCH UNIT: 100 INJECTION, SOLUTION INTRAVENOUS; SUBCUTANEOUS at 16:20

## 2018-11-30 RX ADMIN — ASPIRIN 81 MG CHEWABLE TABLET SCH MG: 81 TABLET CHEWABLE at 09:04

## 2018-11-30 NOTE — PROGRESS NOTE
DATE:  November 30, 2018 



SUBJECTIVE:  Patient was seen at bedside.  In no distress.  Denies any 

history of fever, chills, nausea, or vomiting.



OBJECTIVE   

VITALS:  Afebrile, pulse rate 74, respirations 20, blood pressure 126/66, 

O2 saturation 98%. 

EXTREMITIES:  Ulcerations to the right heel shows some necrosis possibly 

down to bone, more than 5-6 cm in diameter with foul smell present.  Other 

ulcerations on the plantar aspect of right foot and medial aspect of the 

left foot are healing down to subcutaneous and dermis respectively.



LABS:  Noted.  White blood cell count going up to 18.9, hemoglobin 8.9, 

hematocrit 26.1 with a platelet count of 251,000.  



ASSESSMENT

1.  Osteomyelitis with a grade 4 ulcer, right. 

2.  Diabetic neuropathy with peripheral artery disease and lymphedema 

bilaterally.



PLAN:  Will continue local wound care.  Continue IV antibiotics.  Continue 

offloading.  Ulceration will be debrided at bedside possibly tomorrow or 

Sunday.  











DD:  11/30/2018 08:43

DT:  11/30/2018 08:57

Job#:  [id number]

## 2018-11-30 NOTE — PROGRESS NOTE
DATE:  November 30, 2018 



CARDIOLOGY PROGRESS NOTE



SUBJECTIVE:  No new complaints.  



OBJECTIVE   

VITAL SIGNS:  Temperature 96.2, heart rate 74, blood pressure 126/66, 

respiratory rate 20, O2 sat 98%.  BMI 27.8. 

GENERAL:  In no acute distress, alert. 

NECK:  No JVD. 

CHEST:  Clear to auscultation. 

CARDIOVASCULAR:  Regular rate and rhythm.  Normal S1 and S2.

ABDOMEN:  Soft.  Nontender. 

EXTREMITIES:  Edema 1+ to bilateral lower extremities and heel protectors 

in place.  Foot wounds covered with dressings. 



CARDIOVASCULAR MEDICATIONS:  Reviewed.   On vancomycin and metronidazole 

antibiotics.  Simvastatin 10 mg nightly.  Cefepime antibiotic.  Metoprolol 

succinate 50 mg daily.  Aspirin 81 mg daily.  Nifedipine 60 mg every 12 

hours and hydralazine 50 mg q.12 h. 



STUDIES:  Reviewed.  Sodium 140, potassium 3.9, chloride 109, bicarbonate 

17, BUN 53, creatinine 2.84, glucose 82.  White blood cells 18.9, 

hemoglobin 8.9, platelets 251.  INR 1.05.  AST 21, ALT 16, alk phos 89. 



ASSESSMENT

1. Acute renal failure on chronic kidney disease. 

2. Peripheral arterial disease with bilateral foot wounds, outflow 

disease bilaterally by Doppler.

3. Dyslipidemia. 

4. Hypertension.

5. Obstructive uropathy being followed by nephrology.  

6. Anemia.



RECOMMENDATIONS:  Continue conservative management for lower extremity 

wounds at this point in time.  Angiography is not advised given her acute 

renal failure and creatinine of 2.84.  This can be revisited at a later 

date.  For now, agree with antibiotics, wound care and offloading.  

Continue the rest of the cardiovascular medications.







DD:  11/30/2018 13:52

DT:  11/30/2018 14:02

Job#:  [id number]

## 2018-12-01 VITALS — DIASTOLIC BLOOD PRESSURE: 35 MMHG | SYSTOLIC BLOOD PRESSURE: 89 MMHG

## 2018-12-01 VITALS — SYSTOLIC BLOOD PRESSURE: 127 MMHG | DIASTOLIC BLOOD PRESSURE: 66 MMHG

## 2018-12-01 VITALS — SYSTOLIC BLOOD PRESSURE: 91 MMHG | DIASTOLIC BLOOD PRESSURE: 40 MMHG

## 2018-12-01 VITALS — SYSTOLIC BLOOD PRESSURE: 117 MMHG | DIASTOLIC BLOOD PRESSURE: 60 MMHG

## 2018-12-01 VITALS — DIASTOLIC BLOOD PRESSURE: 51 MMHG | SYSTOLIC BLOOD PRESSURE: 91 MMHG

## 2018-12-01 VITALS — DIASTOLIC BLOOD PRESSURE: 41 MMHG | SYSTOLIC BLOOD PRESSURE: 90 MMHG

## 2018-12-01 VITALS — DIASTOLIC BLOOD PRESSURE: 42 MMHG | SYSTOLIC BLOOD PRESSURE: 94 MMHG

## 2018-12-01 VITALS — SYSTOLIC BLOOD PRESSURE: 90 MMHG | DIASTOLIC BLOOD PRESSURE: 36 MMHG

## 2018-12-01 VITALS — SYSTOLIC BLOOD PRESSURE: 91 MMHG | DIASTOLIC BLOOD PRESSURE: 51 MMHG

## 2018-12-01 VITALS — DIASTOLIC BLOOD PRESSURE: 60 MMHG | SYSTOLIC BLOOD PRESSURE: 117 MMHG

## 2018-12-01 VITALS — SYSTOLIC BLOOD PRESSURE: 89 MMHG | DIASTOLIC BLOOD PRESSURE: 35 MMHG

## 2018-12-01 VITALS — SYSTOLIC BLOOD PRESSURE: 90 MMHG | DIASTOLIC BLOOD PRESSURE: 38 MMHG

## 2018-12-01 VITALS — SYSTOLIC BLOOD PRESSURE: 90 MMHG | DIASTOLIC BLOOD PRESSURE: 35 MMHG

## 2018-12-01 VITALS — DIASTOLIC BLOOD PRESSURE: 58 MMHG | SYSTOLIC BLOOD PRESSURE: 113 MMHG

## 2018-12-01 VITALS — SYSTOLIC BLOOD PRESSURE: 107 MMHG | DIASTOLIC BLOOD PRESSURE: 69 MMHG

## 2018-12-01 VITALS — DIASTOLIC BLOOD PRESSURE: 40 MMHG | SYSTOLIC BLOOD PRESSURE: 92 MMHG

## 2018-12-01 VITALS — SYSTOLIC BLOOD PRESSURE: 89 MMHG | DIASTOLIC BLOOD PRESSURE: 40 MMHG

## 2018-12-01 VITALS — SYSTOLIC BLOOD PRESSURE: 96 MMHG | DIASTOLIC BLOOD PRESSURE: 57 MMHG

## 2018-12-01 LAB
ANION GAP SERPL CALC-SCNC: 22.9 MMOL/L (ref 8–16)
ANION GAP SERPL CALC-SCNC: 30 MMOL/L (ref 8–16)
BASE EXCESS BLDA CALC-SCNC: -22 MMOL/L (ref -2–3)
BASOPHILS # BLD AUTO: 0.1 10*3/UL (ref 0–0.1)
BASOPHILS NFR BLD AUTO: 0.3 % (ref 0–1)
BUN SERPL-MCNC: 66 MG/DL (ref 7–26)
BUN SERPL-MCNC: 70 MG/DL (ref 7–26)
BUN/CREAT SERPL: 17 (ref 6–25)
BUN/CREAT SERPL: 18 (ref 6–25)
CALCIUM SERPL-MCNC: 8.6 MG/DL (ref 8.4–10.2)
CALCIUM SERPL-MCNC: 8.6 MG/DL (ref 8.4–10.2)
CHLORIDE SERPL-SCNC: 105 MMOL/L (ref 98–107)
CHLORIDE SERPL-SCNC: 108 MMOL/L (ref 98–107)
CO2 SERPL-SCNC: 13 MMOL/L (ref 22–29)
CO2 SERPL-SCNC: 6 MMOL/L (ref 22–29)
DEPRECATED APTT PLAS QN: 34.7 SECONDS (ref 23.8–35.5)
DEPRECATED INR PLAS: 2.9
DEPRECATED NEUTROPHILS # BLD AUTO: 16.1 10*3/UL (ref 2.1–6.9)
EGFRCR SERPLBLD CKD-EPI 2021: 14 ML/MIN (ref 60–?)
EGFRCR SERPLBLD CKD-EPI 2021: 17 ML/MIN (ref 60–?)
EOSINOPHIL # BLD AUTO: 0.1 10*3/UL (ref 0–0.4)
EOSINOPHIL NFR BLD AUTO: 0.6 % (ref 0–6)
EOSINOPHIL NFR BLD MANUAL: 2 % (ref 0–7)
ERYTHROCYTE [DISTWIDTH] IN CORD BLOOD: 13.8 % (ref 11.7–14.4)
GLUCOSE SERPLBLD-MCNC: 106 MG/DL (ref 74–118)
GLUCOSE SERPLBLD-MCNC: 55 MG/DL (ref 74–118)
HCO3 BLDA-SCNC: 7 MMOL/L (ref 23–28)
HCT VFR BLD AUTO: 26.7 % (ref 38.2–49.6)
HGB BLD-MCNC: 9 G/DL (ref 14–18)
LYMPHOCYTES # BLD: 1.4 10*3/UL (ref 1–3.2)
LYMPHOCYTES NFR BLD AUTO: 7 % (ref 18–39.1)
LYMPHOCYTES NFR BLD MANUAL: 7 % (ref 19–48)
MCH RBC QN AUTO: 31.3 PG (ref 28–32)
MCHC RBC AUTO-ENTMCNC: 33.7 G/DL (ref 31–35)
MCV RBC AUTO: 92.7 FL (ref 81–99)
MONOCYTES # BLD AUTO: 2 10*3/UL (ref 0.2–0.8)
MONOCYTES NFR BLD AUTO: 10.2 % (ref 4.4–11.3)
MONOCYTES NFR BLD MANUAL: 6 % (ref 3.4–9)
NEUTS BAND NFR BLD MANUAL: 1 %
NEUTS SEG NFR BLD AUTO: 80.6 % (ref 38.7–80)
NEUTS SEG NFR BLD MANUAL: 84 % (ref 40–74)
PCO2 BLDA: 21 MMHG (ref 41–51)
PCO2 BLDA: 53 MMHG (ref 80–105)
PH BLDA: 7.13 [PH] (ref 7.31–7.41)
PLAT MORPH BLD: NORMAL
PLATELET # BLD AUTO: 236 X10E3/UL (ref 140–360)
PLATELET # BLD EST: ADEQUATE 10*3/UL
POTASSIUM SERPL-SCNC: 3.9 MMOL/L (ref 3.5–5.1)
POTASSIUM SERPL-SCNC: 5 MMOL/L (ref 3.5–5.1)
PROTHROMBIN TIME: 32.4 SECONDS (ref 11.9–14.5)
RBC # BLD AUTO: 2.88 X10E6/UL (ref 4.3–5.7)
RBC MORPH BLD: NORMAL
SAO2 % BLDA: 77 % (ref 95–98)
SODIUM SERPL-SCNC: 136 MMOL/L (ref 136–145)
SODIUM SERPL-SCNC: 140 MMOL/L (ref 136–145)

## 2018-12-01 PROCEDURE — 5A1935Z RESPIRATORY VENTILATION, LESS THAN 24 CONSECUTIVE HOURS: ICD-10-PCS | Performed by: INTERNAL MEDICINE

## 2018-12-01 PROCEDURE — 0BH17EZ INSERTION OF ENDOTRACHEAL AIRWAY INTO TRACHEA, VIA NATURAL OR ARTIFICIAL OPENING: ICD-10-PCS | Performed by: INTERNAL MEDICINE

## 2018-12-01 PROCEDURE — 02HV33Z INSERTION OF INFUSION DEVICE INTO SUPERIOR VENA CAVA, PERCUTANEOUS APPROACH: ICD-10-PCS

## 2018-12-01 RX ADMIN — MUPIROCIN SCH GM: 20 OINTMENT TOPICAL at 17:00

## 2018-12-01 RX ADMIN — METRONIDAZOLE SCH MLS/HR: 500 INJECTION, SOLUTION INTRAVENOUS at 01:00

## 2018-12-01 RX ADMIN — HEPARIN SODIUM SCH UNIT: 5000 INJECTION INTRAVENOUS; SUBCUTANEOUS at 09:30

## 2018-12-01 RX ADMIN — INSULIN LISPRO SCH UNIT: 100 INJECTION, SOLUTION INTRAVENOUS; SUBCUTANEOUS at 08:00

## 2018-12-01 RX ADMIN — Medication SCH MG: at 11:00

## 2018-12-01 RX ADMIN — HYDRALAZINE HYDROCHLORIDE SCH MG: 25 TABLET ORAL at 21:00

## 2018-12-01 RX ADMIN — METRONIDAZOLE SCH MLS/HR: 500 INJECTION, SOLUTION INTRAVENOUS at 14:00

## 2018-12-01 RX ADMIN — PANTOPRAZOLE SODIUM SCH MG: 40 TABLET, DELAYED RELEASE ORAL at 09:00

## 2018-12-01 RX ADMIN — INSULIN LISPRO SCH UNIT: 100 INJECTION, SOLUTION INTRAVENOUS; SUBCUTANEOUS at 07:30

## 2018-12-01 RX ADMIN — INSULIN LISPRO SCH UNIT: 100 INJECTION, SOLUTION INTRAVENOUS; SUBCUTANEOUS at 17:00

## 2018-12-01 RX ADMIN — METRONIDAZOLE SCH MLS/HR: 500 INJECTION, SOLUTION INTRAVENOUS at 21:59

## 2018-12-01 RX ADMIN — INSULIN LISPRO SCH UNIT: 100 INJECTION, SOLUTION INTRAVENOUS; SUBCUTANEOUS at 11:30

## 2018-12-01 RX ADMIN — INSULIN LISPRO SCH UNIT: 100 INJECTION, SOLUTION INTRAVENOUS; SUBCUTANEOUS at 21:00

## 2018-12-01 RX ADMIN — Medication SCH MG: at 22:13

## 2018-12-01 RX ADMIN — HYDRALAZINE HYDROCHLORIDE SCH MG: 25 TABLET ORAL at 09:00

## 2018-12-01 RX ADMIN — METOPROLOL SUCCINATE SCH MG: 50 TABLET, EXTENDED RELEASE ORAL at 09:00

## 2018-12-01 RX ADMIN — INSULIN LISPRO SCH UNIT: 100 INJECTION, SOLUTION INTRAVENOUS; SUBCUTANEOUS at 12:00

## 2018-12-01 RX ADMIN — COLLAGENASE SANTYL SCH GM: 250 OINTMENT TOPICAL at 09:00

## 2018-12-01 RX ADMIN — FUROSEMIDE SCH MG: 10 INJECTION, SOLUTION INTRAMUSCULAR; INTRAVENOUS at 21:59

## 2018-12-01 RX ADMIN — ACETAMINOPHEN PRN MG: 325 SOLUTION ORAL at 01:00

## 2018-12-01 RX ADMIN — FUROSEMIDE SCH MG: 10 INJECTION, SOLUTION INTRAMUSCULAR; INTRAVENOUS at 14:00

## 2018-12-01 RX ADMIN — SIMVASTATIN SCH MG: 20 TABLET, FILM COATED ORAL at 21:00

## 2018-12-01 RX ADMIN — INSULIN LISPRO SCH UNIT: 100 INJECTION, SOLUTION INTRAVENOUS; SUBCUTANEOUS at 16:30

## 2018-12-01 RX ADMIN — HEPARIN SODIUM SCH UNIT: 5000 INJECTION INTRAVENOUS; SUBCUTANEOUS at 21:59

## 2018-12-01 RX ADMIN — FUROSEMIDE SCH MG: 10 INJECTION, SOLUTION INTRAMUSCULAR; INTRAVENOUS at 09:03

## 2018-12-01 RX ADMIN — MUPIROCIN SCH GM: 20 OINTMENT TOPICAL at 09:00

## 2018-12-01 NOTE — DIAGNOSTIC IMAGING REPORT
EXAMINATION:  CHEST SINGLE (PORTABLE)    



INDICATION:      

^sob

^[id number]

^0900



COMPARISON:  11/27/2018

     

FINDINGS:  AP view   



TUBES and LINES:  None.



LUNGS: Limited by body habitus and slight rotation.  Lungs are well inflated. 

Central vascular congestion.   Retrocardiac opacification.



PLEURA:  No pneumothorax.



HEART AND MEDIASTINUM:  The cardiac silhouette is enlarged.    



BONES AND SOFT TISSUES:  No acute osseous lesion.  Soft tissues are

unremarkable.



UPPER ABDOMEN: No free air under the diaphragm.    



IMPRESSION: 

Pulmonary vascular congestion.

Retrocardiac opacification, representing moderate pleural effusion and/or

pneumonia. There is also suspected small right pleural effusion.





Signed by: Dr. Daniele Givens MD on 12/1/2018 9:33 AM

## 2018-12-01 NOTE — DIAGNOSTIC IMAGING REPORT
EXAMINATION:  CHEST SINGLE (PORTABLE)    



INDICATION:      

^iNTUBATED

^[id number]

^1135



COMPARISON:  Same day x-ray

     

FINDINGS:  AP view   



TUBES and LINES:  Endotracheal tube in place with tip approximately 3.6 cm

above emanuel.



LUNGS:  Lungs are well inflated.  Again seen pulmonary vascular congestion and

retrocardiac opacification.    



PLEURA:  No pneumothorax.



HEART AND MEDIASTINUM:  The cardiomediastinal silhouette is unremarkable.    



BONES AND SOFT TISSUES:  No acute osseous lesion.  Soft tissues are

unremarkable.



UPPER ABDOMEN: No free air under the diaphragm.    



IMPRESSION: 

Status post intubation. Tip of the endotracheal tube approximately 3.6 cm above

emanuel.

Pulmonary status is unchanged.





Signed by: Dr. Daniele Givens MD on 12/1/2018 11:53 AM

## 2018-12-01 NOTE — DIAGNOSTIC IMAGING REPORT
PROCEDURE:US GUIDANCE FOR VASCULAR ACCESS

 

COMPARISON:None.

 

INDICATIONS:Central line in the right internal jugular vein

 

FINDINGS:Ultrasound evaluation of potential access sites was 

performed. The right internal jugular vein is patent and compressible. 

Ultrasound was utilized for vascular access. A permanent recording was 

created for the patient record. 

 

CONCLUSION:Successful IV access by Ultrasound guidance.

 

 

Jason Tomas D.O.  

Dictated by:  Jason Tomas D.O. on 12/01/2018 at 19:38     

Electronically approved by:  Jason Tomas D.O. on 12/01/2018 at 19:38

## 2018-12-01 NOTE — DIAGNOSTIC IMAGING REPORT
PROCEDURE:NON-TUNNELLED CVC CATH PLACMNT

COMPARISON:None.

INDICATIONS:    Sepsis in need of IV access.

COMPLICATIONS: None; patient was coagulopathic with INR of 2.9. 

Procedure was performed with the administration of FFP's ongoing.

MEDICATIONS: None

BLOOD LOSS: Minimal

 

PROCEDURE:

Puncture of the right internal jugular vein was performed after local 

anesthesia with 1% Xylocaine. A 21 gauge skinny needle was placed into 

the vein followed by a 0.018 inch wire. Micropuncture sheath was then 

placed over the skinny wire. A 0.035 inch Amplatz Super Stiff wire was 

placed through the micropuncture sheath. Dilatation with a 7 [place] 

dilator was accomplished. A 7 [place] Arrow triple-lumen central venous 

catheter was then placed over the wire. 

 

A confirmatory film was ordered following placement.

 

CONCLUSION:

Successful placement of a 7 [place] triple-lumen central line. 

 

Jason Tomas D.O.  

Dictated by:  Jason Tomas D.O. on 12/01/2018 at 19:41     

Electronically approved by:  Jason Tomas D.O. on 12/01/2018 at 19:41

## 2018-12-01 NOTE — CONSULTATION
DATE OF CONSULTATION:    



PULMONARY CONSULTATION



REASON FOR THE CONSULT:  Shortness of breath.



HISTORY OF PRESENT ILLNESS:  Mr. Galeano is a 68-year-old male.  I was 

called by Dr. Torres for stat pulmonary consult as patient has shortness of 

breath.  In the room, patient reports severe shortness of breath, was 

tachypneic in 40s.  He is denying any complaints of chest pain, nausea, 

vomiting.  When I intubated the patient, I suctioned abdominal juices from 

the mouth.  He is awake and alert, but tachypneic, in respiratory distress. 

 The source of history is the chart.  Patient presented to the emergency 

room with complaints of foot infection and osteomyelitis.  He has history 

of diabetes, hypertension, hyperlipidemia, stage 4 ulcers in the right 

foot.  He sees Dr. Wright as an outpatient.  He is not on hemodialysis.  Per 

the chart, he has been noncompliant with medical treatment.  He has 

peripheral arterial disease as well.



REVIEW OF SYSTEMS:  In general, denies any fever or chills.  He is short of 

breath.  Complete review of systems cannot be elicited because patient is 

extremely tachypneic and needs intubation.



PAST MEDICAL HISTORY:  Diabetes, hypertension, peripheral arterial disease, 

foot infection, possible osteomyelitis of the foot.  The last note from 

infectious disease suggests that patient has multiple foot wounds and 

Citrobacter and Enterococcus are growing, and patient is being treated.



FAMILY AND SOCIAL HISTORY:  Does not smoke, does not drink.



PHYSICAL EXAMINATION

VITAL SIGNS:  Temperature 96.9, pulse of 52, blood pressure 127/66, 

respiratory rate of 18 to 20, O2 sat is 100% on BiPAP 60%.  He is breathing 

45 times a minute.

HEENT:  Head is atraumatic, normocephalic.  Pupils are reactive.  Oral 

mucosa is very dry.

NECK:  Supple.

CHEST:  Crackles bilaterally.

HEART:  S1, S2 audible.

ABDOMEN:  Soft, nontender.

EXTREMITIES:  No clubbing, cyanosis.  Patient has foot wounds, which are 

dressed.



LABS:  White count of 19,000, hemoglobin 9.0, platelets 236.  Chemistry:  

Sodium 140, potassium 3.9, chloride 108, bicarb 30, BUN 66, creatinine 

3.63.  Creatinine was 2.06 on November 27 when he came in.  There is no 

previous admission here and the baseline is unknown.  Nephrology and 

urology are following the patient.  I have reviewed the chest x-ray film.  

It is showing evidence of bilateral dense alveolar infiltrates and hilar 

enlargement.



ASSESSMENT:  Mr. Galeano is a 68-year-old male who presented with concerns 

of foot osteomyelitis, became short of breath this morning.  Chest x-ray 

showing bilateral alveolar infiltrates.  He has leukocytosis, which can be 

due to osteomyelitis; however, pneumonia cannot be ruled out as there is 

worsening leukocytosis.



CURRENT PROBLEMS

1. Acute hypoxic respiratory failure.

2. Diabetes mellitus.

3. Possible osteomyelitis of the feet and wound.

4. History of hypertension.

5. Acute kidney injury, urinary retention, status post catheter 

placement.  Creatinine is getting worse.  Patient is on diuretics as 

well.



PLAN

1. Patient is in severe respiratory distress.  I will intubate the 

patient.  I have explained the process of intubation to the patient.  

Patient will be put on mechanical ventilator and transferred to ICU.

2. He is currently on Flagyl, cefepime, and vancomycin which will cover 

if there is any infection in the lungs.  This was started by infectious 

disease who is following the patient.

3. Possibly, patient will need hemodialysis.  I will defer that to 

nephrology.  Patient's acute kidney injury is getting worse.

4. IV sedation with fentanyl and Versed post extubation.

5. We will start the patient on DVT prophylaxis with Lovenox 30 mg daily 

because of his renal failure.

6. I will start the patient on GI prophylaxis as well.

7. Tube feeding will be started shortly.  Case discussed with Dr. Torres 

in detail as well.



Critical care time spent 50 minutes.



Thank you for this consult.









DD:  12/01/2018 11:59

DT:  12/01/2018 12:39

Job#:  [id number]

## 2018-12-01 NOTE — DIAGNOSTIC IMAGING REPORT
PROCEDURE:

A single AP view of the chest.

 

COMPARISON: None.

 

INDICATIONS:   CENTRAL LINE PLACEMENT

     

FINDINGS:

Lines/tubes: Endotracheal tube in appropriate position above the 

emanuel. A new right IJ central venous catheter with its tip at the 

cavoatrial junction.

 

Lungs: Moderate pulmonary edema.

 

Pleura: Small bilateral pleural effusions; left greater than right.

 

Heart and mediastinum:  The heart is prominent.  The mediastinum is 

unremarkable.

 

Bones:  No acute bony abnormality.

 

IMPRESSION: 

 

1. Status post right IJ central line placement in good position. 

2. Cardiomegaly with moderate pulmonary edema and small pleural 

effusions. 

 

 

Jason Tomas D.O.  

Dictated by:  Jason Tomas D.O. on 12/01/2018 at 19:36     

Electronically approved by:  Jason Tomas D.O. on 12/01/2018 at 19:36

## 2018-12-01 NOTE — PROGRESS NOTE
DATE:  December 01, 2018 



SUBJECTIVE:  Patient seen in ICU.  Patient has severe shortness of breath 

with dyspnea, intubated.



OBJECTIVE

VITAL SIGNS:  Afebrile, pulse rate 52, respirations 24, blood pressure 

96/57, O2 sat 97%.

EXTREMITIES:  Has positive pitting edema to both lower extremities.  

Ulceration to the left lower extremity shows some gangrenous changes and 

foul smell.  Pedal pulses are diminished.



ASSESSMENT:  Peripheral arterial disease with respiratory failure with 

chronic renal failure.



PLAN:  We will continue to treat foot conservatively.  Continue offloading. 

 Continue diluted wet-to-dry Betadine and Santyl.  We will continue to 

follow.









DD:  12/01/2018 15:35

DT:  12/01/2018 16:22

Job#:  [id number]

## 2018-12-01 NOTE — OPERATIVE REPORT
DATE OF PROCEDURE:    



PREPROCEDURE DIAGNOSIS:  Acute hypoxic respiratory failure.



POSTPROCEDURE DIAGNOSIS:  Acute hypoxic respiratory failure.



PROCEDURE PERFORMED:  Endotracheal intubation.



PROCEDURE DETAIL:  After giving 30 mg of etomidate, MAC 3 blade was used to 

identify vocal cords.  The patient was intubated with 7.5 ET tube in first 

attempt.  Copious GI secretions were suctioned from the ET tube.  

Postprocedure chest x-ray is pending.  Bilateral breath sounds are heart 

after intubation.









DD:  12/01/2018 12:01

DT:  12/01/2018 14:53

Job#:  [id number]

## 2018-12-01 NOTE — DIAGNOSTIC IMAGING REPORT
PROCEDURE:IR CONSULT

 

COMPARISON:None.

 

INDICATIONS:Sepsis in need of IV access.

 

FINDINGS:A right IJ central line was placed with ultrasound guidance.

 

CONCLUSION:Successful central line placement. 

 

 

Jason Tomas D.O.  

Dictated by:  Jason Tomas D.O. on 12/01/2018 at 19:44     

Electronically approved by:  Jason Tomas D.O. on 12/01/2018 at 19:44

## 2018-12-01 NOTE — PROGRESS NOTE
DATE:  December 01, 2018 



CARDIOLOGY PROGRESS NOTE



SUBJECTIVE:  Worsening dyspnea, requiring salvage BiPAP today.  Patient 

transferred to intermediate care unit.  Improvement in symptoms with BiPAP.



OBJECTIVE

VITAL SIGNS:  Temperature 97.1, heart rate 56, respiratory rate 26, blood 

pressure 127/66.  On BiPAP, sating 100%.

GENERAL:  In no acute distress.

NECK:  JVD elevated.

CHEST:  With decreased breath sounds and scattered rales.

CARDIOVASCULAR:  Regular rate and rhythm.  Normal S1 and S2.

ABDOMEN:  Soft.

EXTREMITIES:  Edema and heel protectors in place.  Wounds are covered with 

dressing.



CARDIOVASCULAR MEDICATIONS:  Reviewed.

1. Furosemide 80 mg every 8 hours IV.

2. Simvastatin 10 mg nightly.

3. Metoprolol succinate 50 mg daily.

4. Hydralazine 50 mg every 12 hours.

5. Nifedipine extended release 60 mg every 12 hours.



STUDIES:  White blood cells 19.9; hemoglobin 9; platelets 236; sodium 140, 

potassium 3.9, chloride 108, bicarbonate decreasing at 13, BUN 66, 

creatinine increasing at 3.6, glucose 55, most recent repeat at 95, calcium 

8.6.



ASSESSMENT

1. Worsening acute kidney injury on chronic kidney disease with most 

recent output 250 mL, oliguric.

2. Obstructive uropathy, status post Perry placement.  Followed by 

urology.

3. Dyslipidemia.

4. Hypertension.

5. Anemia.

6. Peripheral arterial disease with bilateral foot wounds and outflow 

disease noted by Doppler.



RECOMMENDATIONS

1. Renal onboard, given deterioration.  Patient on diuretics, currently 

oliguric.  May warrant dialysis.  We will defer it to renal's expertise. 

 For now, salvage BiPAP and continued medical management.

2. No angiogram advised at this point given patient's renal function 

issues.  Continue medical management for this.

3. We will follow closely.







DD:  12/01/2018 10:22

DT:  12/01/2018 10:28

Job#:  [id number]

## 2018-12-02 VITALS — SYSTOLIC BLOOD PRESSURE: 80 MMHG | DIASTOLIC BLOOD PRESSURE: 36 MMHG

## 2018-12-02 VITALS — DIASTOLIC BLOOD PRESSURE: 36 MMHG | SYSTOLIC BLOOD PRESSURE: 79 MMHG

## 2018-12-02 VITALS — SYSTOLIC BLOOD PRESSURE: 90 MMHG | DIASTOLIC BLOOD PRESSURE: 44 MMHG

## 2018-12-02 VITALS — DIASTOLIC BLOOD PRESSURE: 38 MMHG | SYSTOLIC BLOOD PRESSURE: 89 MMHG

## 2018-12-02 VITALS — DIASTOLIC BLOOD PRESSURE: 37 MMHG | SYSTOLIC BLOOD PRESSURE: 89 MMHG

## 2018-12-02 VITALS — DIASTOLIC BLOOD PRESSURE: 43 MMHG | SYSTOLIC BLOOD PRESSURE: 86 MMHG

## 2018-12-02 VITALS — DIASTOLIC BLOOD PRESSURE: 33 MMHG | SYSTOLIC BLOOD PRESSURE: 70 MMHG

## 2018-12-02 VITALS — SYSTOLIC BLOOD PRESSURE: 85 MMHG | DIASTOLIC BLOOD PRESSURE: 69 MMHG

## 2018-12-02 VITALS — DIASTOLIC BLOOD PRESSURE: 34 MMHG | SYSTOLIC BLOOD PRESSURE: 87 MMHG

## 2018-12-02 VITALS — DIASTOLIC BLOOD PRESSURE: 36 MMHG | SYSTOLIC BLOOD PRESSURE: 89 MMHG

## 2018-12-02 VITALS — SYSTOLIC BLOOD PRESSURE: 83 MMHG | DIASTOLIC BLOOD PRESSURE: 29 MMHG

## 2018-12-02 VITALS — SYSTOLIC BLOOD PRESSURE: 85 MMHG | DIASTOLIC BLOOD PRESSURE: 34 MMHG

## 2018-12-02 VITALS — SYSTOLIC BLOOD PRESSURE: 64 MMHG | DIASTOLIC BLOOD PRESSURE: 46 MMHG

## 2018-12-02 VITALS — DIASTOLIC BLOOD PRESSURE: 32 MMHG | SYSTOLIC BLOOD PRESSURE: 88 MMHG

## 2018-12-02 VITALS — SYSTOLIC BLOOD PRESSURE: 88 MMHG | DIASTOLIC BLOOD PRESSURE: 34 MMHG

## 2018-12-02 VITALS — DIASTOLIC BLOOD PRESSURE: 26 MMHG | SYSTOLIC BLOOD PRESSURE: 84 MMHG

## 2018-12-02 VITALS — DIASTOLIC BLOOD PRESSURE: 28 MMHG | SYSTOLIC BLOOD PRESSURE: 83 MMHG

## 2018-12-02 VITALS — DIASTOLIC BLOOD PRESSURE: 35 MMHG | SYSTOLIC BLOOD PRESSURE: 73 MMHG

## 2018-12-02 VITALS — SYSTOLIC BLOOD PRESSURE: 89 MMHG | DIASTOLIC BLOOD PRESSURE: 36 MMHG

## 2018-12-02 VITALS — DIASTOLIC BLOOD PRESSURE: 35 MMHG | SYSTOLIC BLOOD PRESSURE: 83 MMHG

## 2018-12-02 VITALS — DIASTOLIC BLOOD PRESSURE: 28 MMHG | SYSTOLIC BLOOD PRESSURE: 80 MMHG

## 2018-12-02 VITALS — SYSTOLIC BLOOD PRESSURE: 77 MMHG | DIASTOLIC BLOOD PRESSURE: 36 MMHG

## 2018-12-02 VITALS — SYSTOLIC BLOOD PRESSURE: 89 MMHG | DIASTOLIC BLOOD PRESSURE: 34 MMHG

## 2018-12-02 VITALS — DIASTOLIC BLOOD PRESSURE: 28 MMHG | SYSTOLIC BLOOD PRESSURE: 61 MMHG

## 2018-12-02 VITALS — DIASTOLIC BLOOD PRESSURE: 36 MMHG | SYSTOLIC BLOOD PRESSURE: 80 MMHG

## 2018-12-02 VITALS — DIASTOLIC BLOOD PRESSURE: 35 MMHG | SYSTOLIC BLOOD PRESSURE: 79 MMHG

## 2018-12-02 VITALS — SYSTOLIC BLOOD PRESSURE: 85 MMHG | DIASTOLIC BLOOD PRESSURE: 41 MMHG

## 2018-12-02 VITALS — SYSTOLIC BLOOD PRESSURE: 94 MMHG | DIASTOLIC BLOOD PRESSURE: 63 MMHG

## 2018-12-02 VITALS — SYSTOLIC BLOOD PRESSURE: 78 MMHG | DIASTOLIC BLOOD PRESSURE: 32 MMHG

## 2018-12-02 VITALS — SYSTOLIC BLOOD PRESSURE: 74 MMHG | DIASTOLIC BLOOD PRESSURE: 36 MMHG

## 2018-12-02 VITALS — DIASTOLIC BLOOD PRESSURE: 29 MMHG | SYSTOLIC BLOOD PRESSURE: 83 MMHG

## 2018-12-02 VITALS — DIASTOLIC BLOOD PRESSURE: 45 MMHG | SYSTOLIC BLOOD PRESSURE: 83 MMHG

## 2018-12-02 VITALS — SYSTOLIC BLOOD PRESSURE: 73 MMHG | DIASTOLIC BLOOD PRESSURE: 32 MMHG

## 2018-12-02 VITALS — SYSTOLIC BLOOD PRESSURE: 71 MMHG | DIASTOLIC BLOOD PRESSURE: 27 MMHG

## 2018-12-02 VITALS — SYSTOLIC BLOOD PRESSURE: 86 MMHG | DIASTOLIC BLOOD PRESSURE: 31 MMHG

## 2018-12-02 LAB
ANION GAP SERPL CALC-SCNC: 35.2 MMOL/L (ref 8–16)
BASOPHILS # BLD AUTO: 0 10*3/UL (ref 0–0.1)
BASOPHILS NFR BLD AUTO: 0.1 % (ref 0–1)
BUN SERPL-MCNC: 79 MG/DL (ref 7–26)
BUN/CREAT SERPL: 15 (ref 6–25)
CALCIUM SERPL-MCNC: 8.3 MG/DL (ref 8.4–10.2)
CHLORIDE SERPL-SCNC: 105 MMOL/L (ref 98–107)
CO2 SERPL-SCNC: 5 MMOL/L (ref 22–29)
DEPRECATED NEUTROPHILS # BLD AUTO: 17.4 10*3/UL (ref 2.1–6.9)
EGFRCR SERPLBLD CKD-EPI 2021: 11 ML/MIN (ref 60–?)
EOSINOPHIL # BLD AUTO: 0 10*3/UL (ref 0–0.4)
EOSINOPHIL NFR BLD AUTO: 0.1 % (ref 0–6)
ERYTHROCYTE [DISTWIDTH] IN CORD BLOOD: 14.6 % (ref 11.7–14.4)
GLUCOSE SERPLBLD-MCNC: 128 MG/DL (ref 74–118)
HCT VFR BLD AUTO: 24.8 % (ref 38.2–49.6)
HGB BLD-MCNC: 7.1 G/DL (ref 14–18)
LYMPHOCYTES # BLD: 1.3 10*3/UL (ref 1–3.2)
LYMPHOCYTES NFR BLD AUTO: 6.3 % (ref 18–39.1)
LYMPHOCYTES NFR BLD MANUAL: 6 % (ref 19–48)
MCH RBC QN AUTO: 30.7 PG (ref 28–32)
MCHC RBC AUTO-ENTMCNC: 28.6 G/DL (ref 31–35)
MCV RBC AUTO: 107.4 FL (ref 81–99)
MONOCYTES # BLD AUTO: 1.2 10*3/UL (ref 0.2–0.8)
MONOCYTES NFR BLD AUTO: 5.6 % (ref 4.4–11.3)
MONOCYTES NFR BLD MANUAL: 7 % (ref 3.4–9)
NEUTS BAND NFR BLD MANUAL: 7 %
NEUTS SEG NFR BLD AUTO: 85.1 % (ref 38.7–80)
NEUTS SEG NFR BLD MANUAL: 80 % (ref 40–74)
PLAT MORPH BLD: NORMAL
PLATELET # BLD AUTO: 210 X10E3/UL (ref 140–360)
PLATELET # BLD EST: ADEQUATE 10*3/UL
POTASSIUM SERPL-SCNC: 6.2 MMOL/L (ref 3.5–5.1)
RBC # BLD AUTO: 2.31 X10E6/UL (ref 4.3–5.7)
RBC MORPH BLD: NORMAL
SODIUM SERPL-SCNC: 139 MMOL/L (ref 136–145)

## 2018-12-02 RX ADMIN — HEPARIN SODIUM SCH UNIT: 5000 INJECTION INTRAVENOUS; SUBCUTANEOUS at 08:39

## 2018-12-02 RX ADMIN — INSULIN LISPRO SCH UNIT: 100 INJECTION, SOLUTION INTRAVENOUS; SUBCUTANEOUS at 11:30

## 2018-12-02 RX ADMIN — HYDRALAZINE HYDROCHLORIDE SCH MG: 25 TABLET ORAL at 08:39

## 2018-12-02 RX ADMIN — FUROSEMIDE SCH MG: 10 INJECTION, SOLUTION INTRAMUSCULAR; INTRAVENOUS at 05:44

## 2018-12-02 RX ADMIN — METOPROLOL SUCCINATE SCH MG: 50 TABLET, EXTENDED RELEASE ORAL at 08:39

## 2018-12-02 RX ADMIN — METRONIDAZOLE SCH MLS/HR: 500 INJECTION, SOLUTION INTRAVENOUS at 05:52

## 2018-12-02 RX ADMIN — COLLAGENASE SANTYL SCH GM: 250 OINTMENT TOPICAL at 08:39

## 2018-12-02 RX ADMIN — INSULIN LISPRO SCH UNIT: 100 INJECTION, SOLUTION INTRAVENOUS; SUBCUTANEOUS at 11:32

## 2018-12-02 RX ADMIN — INSULIN LISPRO SCH UNIT: 100 INJECTION, SOLUTION INTRAVENOUS; SUBCUTANEOUS at 08:00

## 2018-12-02 RX ADMIN — PANTOPRAZOLE SODIUM SCH MG: 40 TABLET, DELAYED RELEASE ORAL at 08:39

## 2018-12-02 RX ADMIN — Medication SCH MG: at 09:40

## 2018-12-02 RX ADMIN — MUPIROCIN SCH GM: 20 OINTMENT TOPICAL at 08:39

## 2018-12-02 RX ADMIN — INSULIN LISPRO SCH UNIT: 100 INJECTION, SOLUTION INTRAVENOUS; SUBCUTANEOUS at 07:30

## 2018-12-04 LAB
CREAT 24H UR-MRATE: (no result) MG/24HR (ref 800–2000)
CREAT UR-MCNC: (no result) MG/DL (ref 63–166)
DEPRECATED COLLECT DURATION UR QN: (no result) HRS